# Patient Record
Sex: MALE | Race: WHITE | Employment: UNEMPLOYED | URBAN - METROPOLITAN AREA
[De-identification: names, ages, dates, MRNs, and addresses within clinical notes are randomized per-mention and may not be internally consistent; named-entity substitution may affect disease eponyms.]

---

## 2020-10-08 ENCOUNTER — APPOINTMENT (OUTPATIENT)
Dept: CT IMAGING | Age: 30
DRG: 896 | End: 2020-10-08
Attending: HOSPITALIST

## 2020-10-08 ENCOUNTER — HOSPITAL ENCOUNTER (INPATIENT)
Age: 30
LOS: 3 days | Discharge: HOME | DRG: 896 | End: 2020-10-11
Attending: EMERGENCY MEDICINE | Admitting: HOSPITALIST

## 2020-10-08 DIAGNOSIS — F10.930 ALCOHOL WITHDRAWAL SYNDROME WITHOUT COMPLICATION (HCC): ICD-10-CM

## 2020-10-08 DIAGNOSIS — K70.9 ALCOHOLIC LIVER DISEASE (HCC): ICD-10-CM

## 2020-10-08 DIAGNOSIS — D68.9 COAGULOPATHY (HCC): ICD-10-CM

## 2020-10-08 DIAGNOSIS — F10.931 DTS (DELIRIUM TREMENS) (HCC): Primary | ICD-10-CM

## 2020-10-08 DIAGNOSIS — R17 JAUNDICE: ICD-10-CM

## 2020-10-08 DIAGNOSIS — I81 PORTAL VEIN THROMBOSIS: ICD-10-CM

## 2020-10-08 DIAGNOSIS — K70.10 ALCOHOLIC HEPATITIS WITHOUT ASCITES: ICD-10-CM

## 2020-10-08 LAB
ALBUMIN SERPL-MCNC: 2.7 G/DL (ref 3.5–5)
ALBUMIN/GLOB SERPL: 0.5 {RATIO} (ref 1.1–2.2)
ALP SERPL-CCNC: 188 U/L (ref 45–117)
ALT SERPL-CCNC: 59 U/L (ref 12–78)
ANION GAP SERPL CALC-SCNC: 5 MMOL/L (ref 5–15)
AST SERPL-CCNC: 244 U/L (ref 15–37)
BASOPHILS # BLD: 0.1 K/UL (ref 0–0.1)
BASOPHILS NFR BLD: 1 % (ref 0–1)
BILIRUB SERPL-MCNC: 8 MG/DL (ref 0.2–1)
BUN SERPL-MCNC: 7 MG/DL (ref 6–20)
BUN/CREAT SERPL: 7 (ref 12–20)
CALCIUM SERPL-MCNC: 9.1 MG/DL (ref 8.5–10.1)
CHLORIDE SERPL-SCNC: 96 MMOL/L (ref 97–108)
CO2 SERPL-SCNC: 34 MMOL/L (ref 21–32)
COMMENT, HOLDF: NORMAL
CREAT SERPL-MCNC: 0.98 MG/DL (ref 0.7–1.3)
DIFFERENTIAL METHOD BLD: ABNORMAL
EOSINOPHIL # BLD: 0.2 K/UL (ref 0–0.4)
EOSINOPHIL NFR BLD: 2 % (ref 0–7)
ERYTHROCYTE [DISTWIDTH] IN BLOOD BY AUTOMATED COUNT: 14.6 % (ref 11.5–14.5)
ETHANOL SERPL-MCNC: <10 MG/DL
GLOBULIN SER CALC-MCNC: 5.8 G/DL (ref 2–4)
GLUCOSE SERPL-MCNC: 83 MG/DL (ref 65–100)
HCT VFR BLD AUTO: 30.8 % (ref 36.6–50.3)
HGB BLD-MCNC: 10.4 G/DL (ref 12.1–17)
IMM GRANULOCYTES # BLD AUTO: 0.1 K/UL (ref 0–0.04)
IMM GRANULOCYTES NFR BLD AUTO: 1 % (ref 0–0.5)
INR PPP: 2.2 (ref 0.9–1.1)
LYMPHOCYTES # BLD: 1.7 K/UL (ref 0.8–3.5)
LYMPHOCYTES NFR BLD: 16 % (ref 12–49)
MAGNESIUM SERPL-MCNC: 1 MG/DL (ref 1.6–2.4)
MCH RBC QN AUTO: 34.2 PG (ref 26–34)
MCHC RBC AUTO-ENTMCNC: 33.8 G/DL (ref 30–36.5)
MCV RBC AUTO: 101.3 FL (ref 80–99)
MONOCYTES # BLD: 1 K/UL (ref 0–1)
MONOCYTES NFR BLD: 10 % (ref 5–13)
NEUTS SEG # BLD: 7.8 K/UL (ref 1.8–8)
NEUTS SEG NFR BLD: 70 % (ref 32–75)
NRBC # BLD: 0 K/UL (ref 0–0.01)
NRBC BLD-RTO: 0 PER 100 WBC
PLATELET # BLD AUTO: 151 K/UL (ref 150–400)
PMV BLD AUTO: 10.2 FL (ref 8.9–12.9)
POTASSIUM SERPL-SCNC: 2.8 MMOL/L (ref 3.5–5.1)
PROT SERPL-MCNC: 8.5 G/DL (ref 6.4–8.2)
PROTHROMBIN TIME: 21.8 SEC (ref 9–11.1)
RBC # BLD AUTO: 3.04 M/UL (ref 4.1–5.7)
SAMPLES BEING HELD,HOLD: NORMAL
SODIUM SERPL-SCNC: 135 MMOL/L (ref 136–145)
WBC # BLD AUTO: 10.9 K/UL (ref 4.1–11.1)

## 2020-10-08 PROCEDURE — 80053 COMPREHEN METABOLIC PANEL: CPT

## 2020-10-08 PROCEDURE — C9113 INJ PANTOPRAZOLE SODIUM, VIA: HCPCS | Performed by: HOSPITALIST

## 2020-10-08 PROCEDURE — 74011250637 HC RX REV CODE- 250/637: Performed by: EMERGENCY MEDICINE

## 2020-10-08 PROCEDURE — 85610 PROTHROMBIN TIME: CPT

## 2020-10-08 PROCEDURE — 74176 CT ABD & PELVIS W/O CONTRAST: CPT

## 2020-10-08 PROCEDURE — 74011250636 HC RX REV CODE- 250/636: Performed by: HOSPITALIST

## 2020-10-08 PROCEDURE — 65660000000 HC RM CCU STEPDOWN

## 2020-10-08 PROCEDURE — 74011250636 HC RX REV CODE- 250/636: Performed by: EMERGENCY MEDICINE

## 2020-10-08 PROCEDURE — 36415 COLL VENOUS BLD VENIPUNCTURE: CPT

## 2020-10-08 PROCEDURE — 83735 ASSAY OF MAGNESIUM: CPT

## 2020-10-08 PROCEDURE — 99284 EMERGENCY DEPT VISIT MOD MDM: CPT

## 2020-10-08 PROCEDURE — 80307 DRUG TEST PRSMV CHEM ANLYZR: CPT

## 2020-10-08 PROCEDURE — 85025 COMPLETE CBC W/AUTO DIFF WBC: CPT

## 2020-10-08 RX ORDER — CETIRIZINE HCL 10 MG
10 TABLET ORAL DAILY
COMMUNITY
End: 2021-11-05

## 2020-10-08 RX ORDER — PHENOL/SODIUM PHENOLATE
20 AEROSOL, SPRAY (ML) MUCOUS MEMBRANE DAILY
COMMUNITY
End: 2020-10-11

## 2020-10-08 RX ORDER — DIAZEPAM 5 MG/1
10 TABLET ORAL
Status: DISCONTINUED | OUTPATIENT
Start: 2020-10-08 | End: 2020-10-11 | Stop reason: HOSPADM

## 2020-10-08 RX ORDER — POTASSIUM CHLORIDE 750 MG/1
40 TABLET, FILM COATED, EXTENDED RELEASE ORAL
Status: COMPLETED | OUTPATIENT
Start: 2020-10-08 | End: 2020-10-08

## 2020-10-08 RX ORDER — THERA TABS 400 MCG
1 TAB ORAL DAILY
COMMUNITY
End: 2021-11-05

## 2020-10-08 RX ORDER — LORAZEPAM 2 MG/ML
2 INJECTION INTRAMUSCULAR
Status: DISCONTINUED | OUTPATIENT
Start: 2020-10-08 | End: 2020-10-11 | Stop reason: HOSPADM

## 2020-10-08 RX ORDER — FOLIC ACID 1 MG/1
1 TABLET ORAL
Status: COMPLETED | OUTPATIENT
Start: 2020-10-08 | End: 2020-10-08

## 2020-10-08 RX ORDER — DIAZEPAM 5 MG/1
20 TABLET ORAL
Status: DISCONTINUED | OUTPATIENT
Start: 2020-10-08 | End: 2020-10-11 | Stop reason: HOSPADM

## 2020-10-08 RX ORDER — DIAZEPAM 5 MG/1
10 TABLET ORAL
Status: COMPLETED | OUTPATIENT
Start: 2020-10-08 | End: 2020-10-08

## 2020-10-08 RX ORDER — LANOLIN ALCOHOL/MO/W.PET/CERES
100 CREAM (GRAM) TOPICAL
Status: COMPLETED | OUTPATIENT
Start: 2020-10-08 | End: 2020-10-08

## 2020-10-08 RX ORDER — ONDANSETRON 2 MG/ML
4 INJECTION INTRAMUSCULAR; INTRAVENOUS
Status: DISCONTINUED | OUTPATIENT
Start: 2020-10-08 | End: 2020-10-11 | Stop reason: HOSPADM

## 2020-10-08 RX ORDER — LORAZEPAM 2 MG/ML
4 INJECTION INTRAMUSCULAR
Status: DISCONTINUED | OUTPATIENT
Start: 2020-10-08 | End: 2020-10-11 | Stop reason: HOSPADM

## 2020-10-08 RX ADMIN — FOLIC ACID 1 MG: 1 TABLET ORAL at 14:51

## 2020-10-08 RX ADMIN — DIAZEPAM 10 MG: 5 TABLET ORAL at 18:23

## 2020-10-08 RX ADMIN — DIAZEPAM 10 MG: 5 TABLET ORAL at 14:42

## 2020-10-08 RX ADMIN — POTASSIUM CHLORIDE 40 MEQ: 750 TABLET, FILM COATED, EXTENDED RELEASE ORAL at 15:47

## 2020-10-08 RX ADMIN — Medication 100 MG: at 14:52

## 2020-10-08 RX ADMIN — SODIUM CHLORIDE 40 MG: 9 INJECTION, SOLUTION INTRAMUSCULAR; INTRAVENOUS; SUBCUTANEOUS at 23:56

## 2020-10-08 RX ADMIN — SODIUM CHLORIDE 1000 ML: 900 INJECTION, SOLUTION INTRAVENOUS at 14:51

## 2020-10-08 RX ADMIN — DIAZEPAM 10 MG: 5 TABLET ORAL at 23:53

## 2020-10-08 RX ADMIN — DIAZEPAM 10 MG: 5 TABLET ORAL at 15:48

## 2020-10-08 NOTE — PROGRESS NOTES
Admission Medication Reconciliation:          Spoke with patient by telephone @ [phone] , unable to speak with patient face to face at this time due to general isolation precautions in the ED related to COVID-19 pandemic. Interview included questions regarding use of:  PTA medications including prescription/OTC, vitamins, supplements, inhaled, topical, injectable, otic and ophthalmic medications  Alcohol, nicotine products, THC and related compounds, illicit drugs, stimulant use (i.e., Red Bull), agents used to assist with sleep and/or pain control issues, and whether patient uses other people's medications of any kind    Notes:  ETOH: see chart notes-MD aware and treating DTs    Medication changes (since last review): Added all agents  Thank you for allowing me to participate in the care of your patient. Natalia Hart PharmD, RN # [phone]       [facility] pharmacy benefit data reflects medications filled and processed through the patient's insurance, however   this data does NOT capture whether the medication was picked up or is currently being taken by the patient. Allergies: Other medication    Significant PMH/Disease States:   Past Medical History:   Diagnosis Date    IVDU (intravenous drug user)      Chief Complaint for this Admission:    Chief Complaint   Patient presents with    Anxiety     Prior to Admission Medications:   Prior to Admission Medications   Prescriptions Last Dose Informant Taking? Omeprazole delayed release (PRILOSEC D/R) 20 mg tablet 10/7/2020 at Unknown time  Yes   Sig: Take 20 mg by mouth daily. cetirizine (ZYRTEC) 10 mg tablet 10/7/2020 at Unknown time  Yes   Sig: Take 10 mg by mouth daily. therapeutic multivitamin (THERAGRAN) tablet 10/7/2020 at Unknown time  Yes   Sig: Take 1 Tab by mouth daily.       Facility-Administered Medications: None       Please contact the main inpatient pharmacy with any questions or concerns at [phone] and we will direct you to the clinical pharmacist covering this patient's care while in-house.    JOAN Light

## 2020-10-08 NOTE — H&P
History & Physical    Primary Care Provider: Other, MD Soraya  Source of Information: Patient     History of Presenting Illness:   Sebastien Mcgrath is a 27 y.o. male who presents with abdominal pain     Pt started drinking again 2 yrs ago and has been drinking 10 shots of whiskey/day x 4-5 months. Quit drinking Sunday and had nausea/vomiting x 3 days. Yesterday developed auditory and visual hallucinations. Is also shaky and anxious.  Anitharashad Carr said he stopped drinking on Sunday and now pt having hallucinations and hearing things, denies feeling shaky, +nausea, denies SI or HI, pt stated he is getting \"angry at my brain because it won't shut off\", pt stated his liver levels might be off. Complaining of abd discomfort. Review of Systems:  General: HPI, no fever, low appetitie. No changes of weight  HEENT: no headache, no vision changes, no nose discharge, no hearing changes   RES: no wheezing, no cough, no sob  CVS: no cp, no palpitation. Muscular: no joint swelling, no muscle pain, no leg swelling  Skin: no rash, no itching   GI: HPI. : no dysuria, no hematuria  Hemo: no gum bleeding, no petechial   Neuro: no sensation changes, no focal weakness   Endo: no polydipsia   Psych: HPI     Past Medical History:   Diagnosis Date    IVDU (intravenous drug user)       History reviewed. No pertinent surgical history. Prior to Admission medications    Not on File     Allergies   Allergen Reactions    Other Medication Other (comments)     opiods      History reviewed. No pertinent family history.      SOCIAL HISTORY:  Patient resides:  Independently x   Assisted Living    SNF    With family care       Smoking history:   None x   Former    Chronic x     Alcohol history:   None    Social    Chronic x     Ambulates:   Independently x   w/cane    w/walker    w/wc    CODE STATUS:x  DNR    Full x   Other      Objective:     Physical Exam:     Visit Vitals  /71   Pulse (!) 105   Temp 97.6 °F (36.4 °C)   Resp 16   SpO2 99%      O2 Device: Room air    General:  Alert, cooperative, no distress, appears stated age. + jaundice    Head:  Normocephalic, without obvious abnormality, atraumatic. Eyes:  Conjunctivae/corneas clear. PERRL, EOMs intact. Nose: Nares normal. Septum midline. Mucosa normal. No drainage or sinus tenderness. Throat: Lips, mucosa, and tongue normal. Teeth and gums normal.   Neck: Supple, symmetrical, trachea midline, no adenopathy, thyroid: no enlargement/tenderness/nodules, no carotid bruit and no JVD. Back:   Symmetric, no curvature. ROM normal. No CVA tenderness. Lungs:   Clear to auscultation bilaterally. Chest wall:  No tenderness or deformity. Heart:  Regular rate and rhythm, S1, S2 normal, no murmur, click, rub or gallop. Abdomen:   Soft, epigastric tenderness . Bowel sounds normal. No masses,  No organomegaly. Extremities: Extremities normal, atraumatic, no cyanosis or edema. Pulses: 2+ and symmetric all extremities. Skin: Skin color, texture, turgor normal. No rashes or lesions   Neurologic: CNII-XII intact. No focal weakness              Data Review:     Recent Days:  Recent Labs     10/08/20  1424   WBC 10.9   HGB 10.4*   HCT 30.8*        Recent Labs     10/08/20  1424   *   K 2.8*   CL 96*   CO2 34*   GLU 83   BUN 7   CREA 0.98   CA 9.1   ALB 2.7*   ALT 59     No results for input(s): PH, PCO2, PO2, HCO3, FIO2 in the last 72 hours.     24 Hour Results:  Recent Results (from the past 24 hour(s))   CBC WITH AUTOMATED DIFF    Collection Time: 10/08/20  2:24 PM   Result Value Ref Range    WBC 10.9 4.1 - 11.1 K/uL    RBC 3.04 (L) 4.10 - 5.70 M/uL    HGB 10.4 (L) 12.1 - 17.0 g/dL    HCT 30.8 (L) 36.6 - 50.3 %    .3 (H) 80.0 - 99.0 FL    MCH 34.2 (H) 26.0 - 34.0 PG    MCHC 33.8 30.0 - 36.5 g/dL    RDW 14.6 (H) 11.5 - 14.5 %    PLATELET 468 621 - 592 K/uL    MPV 10.2 8.9 - 12.9 FL    NRBC 0.0 0  WBC    ABSOLUTE NRBC 0.00 0.00 - 0.01 K/uL NEUTROPHILS 70 32 - 75 %    LYMPHOCYTES 16 12 - 49 %    MONOCYTES 10 5 - 13 %    EOSINOPHILS 2 0 - 7 %    BASOPHILS 1 0 - 1 %    IMMATURE GRANULOCYTES 1 (H) 0.0 - 0.5 %    ABS. NEUTROPHILS 7.8 1.8 - 8.0 K/UL    ABS. LYMPHOCYTES 1.7 0.8 - 3.5 K/UL    ABS. MONOCYTES 1.0 0.0 - 1.0 K/UL    ABS. EOSINOPHILS 0.2 0.0 - 0.4 K/UL    ABS. BASOPHILS 0.1 0.0 - 0.1 K/UL    ABS. IMM. GRANS. 0.1 (H) 0.00 - 0.04 K/UL    DF AUTOMATED     METABOLIC PANEL, COMPREHENSIVE    Collection Time: 10/08/20  2:24 PM   Result Value Ref Range    Sodium 135 (L) 136 - 145 mmol/L    Potassium 2.8 (L) 3.5 - 5.1 mmol/L    Chloride 96 (L) 97 - 108 mmol/L    CO2 34 (H) 21 - 32 mmol/L    Anion gap 5 5 - 15 mmol/L    Glucose 83 65 - 100 mg/dL    BUN 7 6 - 20 MG/DL    Creatinine 0.98 0.70 - 1.30 MG/DL    BUN/Creatinine ratio 7 (L) 12 - 20      GFR est AA >60 >60 ml/min/1.73m2    GFR est non-AA >60 >60 ml/min/1.73m2    Calcium 9.1 8.5 - 10.1 MG/DL    Bilirubin, total 8.0 (H) 0.2 - 1.0 MG/DL    ALT (SGPT) 59 12 - 78 U/L    AST (SGOT) 244 (H) 15 - 37 U/L    Alk. phosphatase 188 (H) 45 - 117 U/L    Protein, total 8.5 (H) 6.4 - 8.2 g/dL    Albumin 2.7 (L) 3.5 - 5.0 g/dL    Globulin 5.8 (H) 2.0 - 4.0 g/dL    A-G Ratio 0.5 (L) 1.1 - 2.2     ETHYL ALCOHOL    Collection Time: 10/08/20  2:24 PM   Result Value Ref Range    ALCOHOL(ETHYL),SERUM <10 <10 MG/DL   SAMPLES BEING HELD    Collection Time: 10/08/20  2:24 PM   Result Value Ref Range    SAMPLES BEING HELD 1BLU     COMMENT        Add-on orders for these samples will be processed based on acceptable specimen integrity and analyte stability, which may vary by analyte. Imaging:   No results found. Assessment:     Active Problems:    DTs (delirium tremens) ([facility] (10/8/2020)           Plan:     1. DT/alcohol withdrawal: ativan per CIWA protocal, seizure precaution  2. Hyperbilirubinemia: like due to severe acholics hepatitis , will check abd CT to eval CBD and gallstones.  Check INR, hepatologist consult   3. Hypokalemia: gave po kcl in ER. Will check Mag too.         Signed By: Halina Gil MD     October 8, 2020

## 2020-10-08 NOTE — ED PROVIDER NOTES
Pt started drinking again 2 yrs ago and has been drinking 10 shots of whiskey/day x 4-5 months. Quit drinking Sunday and had nausea/vomiting x 3 days. Yesterday developed auditory and visual hallucinations. Is also shaky and anxious. The history is provided by the patient. Delirium Tremens (DTS)   Primary symptoms include: hallucinations. There areno confusion, no loss of consciousness, no seizures and no self-injury present at this time. This is a new problem. The current episode started yesterday. The problem has been gradually worsening. Suspected agents include alcohol. Associated symptoms include nausea and vomiting. Pertinent negatives include no fever. Associated medical issues do not include suicidal ideas. Past Medical History:   Diagnosis Date    IVDU (intravenous drug user)        History reviewed. No pertinent surgical history. History reviewed. No pertinent family history.     Social History     Socioeconomic History    Marital status: SINGLE     Spouse name: Not on file    Number of children: Not on file    Years of education: Not on file    Highest education level: Not on file   Occupational History    Not on file   Social Needs    Financial resource strain: Not on file    Food insecurity     Worry: Not on file     Inability: Not on file    Transportation needs     Medical: Not on file     Non-medical: Not on file   Tobacco Use    Smoking status: Current Every Day Smoker     Packs/day: 1.00   Substance and Sexual Activity    Alcohol use: Yes     Comment: 14-18 drinks/week    Drug use: Yes     Types: Heroin, Marijuana    Sexual activity: Not on file   Lifestyle    Physical activity     Days per week: Not on file     Minutes per session: Not on file    Stress: Not on file   Relationships    Social connections     Talks on phone: Not on file     Gets together: Not on file     Attends [employer] service: Not on file     Active member of club or organization: Not on file Attends meetings of clubs or organizations: Not on file     Relationship status: Not on file    Intimate partner violence     Fear of current or ex partner: Not on file     Emotionally abused: Not on file     Physically abused: Not on file     Forced sexual activity: Not on file   Other Topics Concern    Not on file   Social History Narrative    Not on file         ALLERGIES: Other medication    Review of Systems   Constitutional: Negative for chills and fever. Respiratory: Negative for shortness of breath. Cardiovascular: Negative for chest pain. Gastrointestinal: Positive for nausea and vomiting. Negative for abdominal pain, constipation and diarrhea. Neurological: Positive for tremors. Negative for dizziness, seizures, loss of consciousness and light-headedness. Psychiatric/Behavioral: Positive for hallucinations. Negative for confusion, self-injury and suicidal ideas. The patient is nervous/anxious. All other systems reviewed and are negative. Vitals:    10/08/20 1408   BP: (!) 142/87   Pulse: (!) 111   Resp: 16   Temp: 97.6 °F (36.4 °C)   SpO2: 99%            Physical Exam  Vitals signs and nursing note reviewed. Constitutional:       Appearance: He is well-developed. HENT:      Head: Normocephalic and atraumatic. Eyes:      General: No scleral icterus. Neck:      Musculoskeletal: Normal range of motion. Cardiovascular:      Rate and Rhythm: Tachycardia present. Pulmonary:      Effort: Pulmonary effort is normal.   Abdominal:      General: There is no distension. Skin:     Findings: No erythema or rash. Neurological:      Mental Status: He is alert and oriented to person, place, and time. Motor: Tremor present. Gait: Gait normal.   Psychiatric:         Attention and Perception: Attention and perception normal.         Mood and Affect: Mood is anxious. Speech: Speech is rapid and pressured. Behavior: Behavior is hyperactive.          Thought Content: Thought content normal.          MDM       Procedures      Patient is being admitted to the hospital.  The results of their tests and reasons for their admission have been discussed with them and/or available family. They convey agreement and understanding for the need to be admitted and for their admission diagnosis. Consultation will be made now with the inpatient physician for hospitalization. Perfect Serve Consult for Admission  3:15 PM    ED Room Number: ER12/12  Patient Name and age:  Ganesh Engle 27 y.o.  male  Working Diagnosis:   1.  DTs (delirium tremens) ([facility]      COVID-19 Suspicion:  no  Sepsis present:  no  Reassessment needed: no  Code Status:  Full Code  Readmission: no  Isolation Requirements:  no  Recommended Level of Care:  telemetry  Department:[facility] - 21   Other:  On CIWA

## 2020-10-08 NOTE — ED TRIAGE NOTES
Pt stated he stopped drinking on Sunday and now pt having hallucinations and hearing things, denies feeling shaky, +nausea, denies SI or HI, pt stated he is getting \"angry at my brain because it won't shut off\", pt stated his liver levels might be off, pt rambling in triage

## 2020-10-09 ENCOUNTER — APPOINTMENT (OUTPATIENT)
Dept: ULTRASOUND IMAGING | Age: 30
DRG: 896 | End: 2020-10-09
Attending: HOSPITALIST

## 2020-10-09 LAB
ALBUMIN SERPL-MCNC: 2.3 G/DL (ref 3.5–5)
ALBUMIN/GLOB SERPL: 0.5 {RATIO} (ref 1.1–2.2)
ALP SERPL-CCNC: 178 U/L (ref 45–117)
ALT SERPL-CCNC: 53 U/L (ref 12–78)
ANION GAP SERPL CALC-SCNC: 5 MMOL/L (ref 5–15)
AST SERPL-CCNC: 206 U/L (ref 15–37)
BILIRUB SERPL-MCNC: 6.3 MG/DL (ref 0.2–1)
BUN SERPL-MCNC: 8 MG/DL (ref 6–20)
BUN/CREAT SERPL: 9 (ref 12–20)
CALCIUM SERPL-MCNC: 7.3 MG/DL (ref 8.5–10.1)
CHLORIDE SERPL-SCNC: 103 MMOL/L (ref 97–108)
CO2 SERPL-SCNC: 29 MMOL/L (ref 21–32)
COMMENT, HOLDF: NORMAL
COMMENT, HOLDF: NORMAL
CREAT SERPL-MCNC: 0.92 MG/DL (ref 0.7–1.3)
GLOBULIN SER CALC-MCNC: 5.1 G/DL (ref 2–4)
GLUCOSE SERPL-MCNC: 83 MG/DL (ref 65–100)
INR PPP: 2.2 (ref 0.9–1.1)
LIPASE SERPL-CCNC: 213 U/L (ref 73–393)
MAGNESIUM SERPL-MCNC: 1 MG/DL (ref 1.6–2.4)
PHOSPHATE SERPL-MCNC: 1.6 MG/DL (ref 2.6–4.7)
POTASSIUM SERPL-SCNC: 3 MMOL/L (ref 3.5–5.1)
PROT SERPL-MCNC: 7.4 G/DL (ref 6.4–8.2)
PROTHROMBIN TIME: 22.2 SEC (ref 9–11.1)
SAMPLES BEING HELD,HOLD: NORMAL
SAMPLES BEING HELD,HOLD: NORMAL
SODIUM SERPL-SCNC: 137 MMOL/L (ref 136–145)

## 2020-10-09 PROCEDURE — 36415 COLL VENOUS BLD VENIPUNCTURE: CPT

## 2020-10-09 PROCEDURE — 80053 COMPREHEN METABOLIC PANEL: CPT

## 2020-10-09 PROCEDURE — C9113 INJ PANTOPRAZOLE SODIUM, VIA: HCPCS | Performed by: HOSPITALIST

## 2020-10-09 PROCEDURE — 83735 ASSAY OF MAGNESIUM: CPT

## 2020-10-09 PROCEDURE — 74011250637 HC RX REV CODE- 250/637: Performed by: EMERGENCY MEDICINE

## 2020-10-09 PROCEDURE — 74011250636 HC RX REV CODE- 250/636: Performed by: INTERNAL MEDICINE

## 2020-10-09 PROCEDURE — 74011250637 HC RX REV CODE- 250/637: Performed by: NURSE PRACTITIONER

## 2020-10-09 PROCEDURE — 85610 PROTHROMBIN TIME: CPT

## 2020-10-09 PROCEDURE — 74011000258 HC RX REV CODE- 258: Performed by: INTERNAL MEDICINE

## 2020-10-09 PROCEDURE — 74011250636 HC RX REV CODE- 250/636: Performed by: HOSPITALIST

## 2020-10-09 PROCEDURE — 74011000250 HC RX REV CODE- 250: Performed by: HOSPITALIST

## 2020-10-09 PROCEDURE — 76700 US EXAM ABDOM COMPLETE: CPT

## 2020-10-09 PROCEDURE — 84100 ASSAY OF PHOSPHORUS: CPT

## 2020-10-09 PROCEDURE — 99223 1ST HOSP IP/OBS HIGH 75: CPT | Performed by: INTERNAL MEDICINE

## 2020-10-09 PROCEDURE — 65660000000 HC RM CCU STEPDOWN

## 2020-10-09 PROCEDURE — 83690 ASSAY OF LIPASE: CPT

## 2020-10-09 RX ORDER — SERTRALINE HYDROCHLORIDE 50 MG/1
25 TABLET, FILM COATED ORAL DAILY
Status: DISCONTINUED | OUTPATIENT
Start: 2020-10-10 | End: 2020-10-11 | Stop reason: HOSPADM

## 2020-10-09 RX ORDER — DEXTROSE, SODIUM CHLORIDE, AND POTASSIUM CHLORIDE 5; .9; .15 G/100ML; G/100ML; G/100ML
125 INJECTION INTRAVENOUS CONTINUOUS
Status: DISCONTINUED | OUTPATIENT
Start: 2020-10-09 | End: 2020-10-11 | Stop reason: HOSPADM

## 2020-10-09 RX ORDER — MAGNESIUM SULFATE HEPTAHYDRATE 40 MG/ML
2 INJECTION, SOLUTION INTRAVENOUS ONCE
Status: COMPLETED | OUTPATIENT
Start: 2020-10-09 | End: 2020-10-09

## 2020-10-09 RX ORDER — HYDROXYZINE 25 MG/1
50 TABLET, FILM COATED ORAL
Status: DISCONTINUED | OUTPATIENT
Start: 2020-10-09 | End: 2020-10-11 | Stop reason: HOSPADM

## 2020-10-09 RX ORDER — QUETIAPINE FUMARATE 25 MG/1
25 TABLET, FILM COATED ORAL
Status: DISCONTINUED | OUTPATIENT
Start: 2020-10-09 | End: 2020-10-11 | Stop reason: HOSPADM

## 2020-10-09 RX ADMIN — POTASSIUM PHOSPHATE, MONOBASIC AND POTASSIUM PHOSPHATE, DIBASIC: 224; 236 INJECTION, SOLUTION, CONCENTRATE INTRAVENOUS at 08:32

## 2020-10-09 RX ADMIN — PHYTONADIONE 10 MG: 10 INJECTION, EMULSION INTRAMUSCULAR; INTRAVENOUS; SUBCUTANEOUS at 19:46

## 2020-10-09 RX ADMIN — POTASSIUM CHLORIDE, DEXTROSE MONOHYDRATE AND SODIUM CHLORIDE 125 ML/HR: 150; 5; 900 INJECTION, SOLUTION INTRAVENOUS at 17:34

## 2020-10-09 RX ADMIN — HYDROXYZINE HYDROCHLORIDE 50 MG: 25 TABLET, FILM COATED ORAL at 21:00

## 2020-10-09 RX ADMIN — SODIUM CHLORIDE 40 MG: 9 INJECTION, SOLUTION INTRAMUSCULAR; INTRAVENOUS; SUBCUTANEOUS at 08:32

## 2020-10-09 RX ADMIN — DIAZEPAM 20 MG: 5 TABLET ORAL at 18:56

## 2020-10-09 RX ADMIN — MAGNESIUM SULFATE 2 G: 2 INJECTION INTRAVENOUS at 09:18

## 2020-10-09 RX ADMIN — DIAZEPAM 10 MG: 5 TABLET ORAL at 13:05

## 2020-10-09 RX ADMIN — POTASSIUM CHLORIDE, DEXTROSE MONOHYDRATE AND SODIUM CHLORIDE 125 ML/HR: 150; 5; 900 INJECTION, SOLUTION INTRAVENOUS at 09:12

## 2020-10-09 RX ADMIN — SODIUM CHLORIDE 40 MG: 9 INJECTION, SOLUTION INTRAMUSCULAR; INTRAVENOUS; SUBCUTANEOUS at 20:59

## 2020-10-09 RX ADMIN — FOLIC ACID: 5 INJECTION, SOLUTION INTRAMUSCULAR; INTRAVENOUS; SUBCUTANEOUS at 05:19

## 2020-10-09 RX ADMIN — METHYLPREDNISOLONE SODIUM SUCCINATE 20 MG: 40 INJECTION, POWDER, FOR SOLUTION INTRAMUSCULAR; INTRAVENOUS at 20:54

## 2020-10-09 RX ADMIN — DIAZEPAM 10 MG: 5 TABLET ORAL at 21:03

## 2020-10-09 NOTE — PROGRESS NOTES
TRANSFER - OUT REPORT:    Verbal report given to Kenrick(name) on Toby Travis  being transferred to [facility](unit) for routine progression of care       Report consisted of patients Situation, Background, Assessment and   Recommendations(SBAR). Information from the following report(s) SBAR, Kardex, ED Summary, Intake/Output and Accordion was reviewed with the receiving nurse. Lines:   Peripheral IV 10/08/20 Left Antecubital (Active)   Site Assessment Clean, dry, & intact 10/09/20 1513   Phlebitis Assessment 0 10/09/20 1513   Infiltration Assessment 0 10/09/20 1513   Dressing Status Clean, dry, & intact 10/09/20 1513   Dressing Type Tape;Transparent 10/09/20 1513   Hub Color/Line Status Pink; Infusing 10/09/20 1513   Action Taken Open ports on tubing capped 10/09/20 1513   Alcohol Cap Used Yes 10/09/20 1513       Peripheral IV 10/09/20 Right Hand (Active)   Site Assessment Clean, dry, & intact 10/09/20 1513   Phlebitis Assessment 0 10/09/20 1513   Infiltration Assessment 0 10/09/20 1513   Dressing Status Clean, dry, & intact 10/09/20 1513   Dressing Type Tape;Transparent 10/09/20 1513   Hub Color/Line Status Pink;Flushed;Capped 10/09/20 1513   Action Taken Open ports on tubing capped 10/09/20 1513   Alcohol Cap Used Yes 10/09/20 1513        Opportunity for questions and clarification was provided. Patient transported with:   Tech                    Problem: Alcohol Withdrawal  Goal: *STG: Participates in treatment plan  Outcome: Progressing Towards Goal     Pt on CIWA. Valium given once.      Problem: Alcohol Withdrawal  Goal: *STG: Remains safe in hospital  Outcome: Progressing Towards Goal

## 2020-10-09 NOTE — ED NOTES
Bedside shift change report given to Paulette (oncoming nurse) by Jimbo Mcdaniel (offgoing nurse). Report included the following information SBAR, ED Summary and Recent Results.

## 2020-10-09 NOTE — CONSULTS
[address] [facility], [facility], MD Nargis Pereyra, AGNIESZKA Morales, [facility]     Coreen Fall, [facility]   Hamilton Magno, MARIE Cantrell, [facility]       Robe Regalado [facility] [address]    at [address], [facility] Yemi Lomas  22.    [phone]    FAX: [address] [place] [address], [address]    [phone]    FAX: [phone]         HEPATOLOGY CONSULT NOTE  I was asked to see this patient in consultation by Dr Rocio Nowak for management of alcoholic hepatitis. I have reviewed the Emergency room note, Hospital admission note, Notes by all other physicians who have seen the patient during this hospitalization to date. I have reviewed the problem list and the reason for this hospitalization. I have reviewed the allergies and the medications the patient was taking at home prior to this hospitalization. HISTORY:  The patient is a 27year old  male with a long history of alcohol abuse and IV drug abuse. He stopped using IV drugs in 2015. Entered alcohol rehab, was abstinent from several ears and thought he could drink socialy. This escalated and for the past several months he has been consuming 6 shots per day. This AM he felt poorly, noted his eyes were yellow, vomited and then had a \"panic attack\". He was brought to the ED by his brother. The is no other PMH. In the ED he was found to have  TBILI 8.0 and INR 2.2 with DF 44. ASSESSMENT AND PLAN:  Alcohol liver disease  The diagnosis is based upon a history of consuming alcohol in excess. The histologic severity has not been defined. The patient is consuming 6 alcoholic beverages daily.   Last drink was the day prior to coming to the ED    Discussed the need to stop using alcohol   Will monitor for alcohol withdrawal and DTs    Alcohol hepatitis  There is an elevation in TBILI to 8.0. The INR is prolonged to 2.2. The DF is 44. The alcoholic hepatitis is severe and associated with a 30% mortality in the next 6 months. Will start IV solumedrol 20 mg BID   Will convert to PO prednisone when ready to DC and continue steroids for full 28 days. Although the CT suggests cirrhosis it is not clear if the patient has developed cirrhosis. Coagulopathy  This is secondary to alcoholic hepatitis  Will treat with 3 doses of Vitamin K over 3 days. 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 Will need dynamic MRI to clarify this. Would treat with anticoagulation if thre is PV thrombosis. Cannot treat with anticoagulation now in setting of coagulopathy. ETOH hep needs to show recovery first.    Acute pancreatitis  The CT suggests the pancreas is inflamed. No Lipase availble. This may have contributed to PV thrombosis      SYSTEM REVIEW:  Constitution systems: Negative for fever, chills, weight gain, weight loss. Eyes: Negative for visual changes. ENT: Negative for sore throat, painful swallowing. Respiratory: Negative for cough, hemoptysis, SOB. Cardiology: Negative for chest pain, palpitations. GI:  Negative for constipation or diarrhea. : Negative for urinary frequency, dysuria, hematuria, nocturia. Skin: Negative for rash. Hematology: Negative for easy bruising, blood clots. Musculo-skelatal: Negative for back pain, muscle pain, weakness. Neurologic: Negative for headaches, dizziness, vertigo, memory problems not related to HE. Psychology: Negative for anxiety, depression. FAMILY HISTORY:  The patient has no knowledge of the father's medical condition.     The mother Has/had the following chronic disease(s): Consumes alcohol in excess  There is no family history of liver disease. SOCIAL HISTORY:  The patient has never been . The patient has no children. The patient currently smokes a few cigarettes daily. The patient consumes 6 alcoholic beverages per day   The patient used to work for [employer]. He was laid off during [employer]. He is now looking for a new job. PHYSICAL EXAMINATION:  VS: per nursing note  General:  No acute distress. Eyes:  Sclera icteric  ENT:  No oral lesions. Thyroid normal.  Nodes:  No adenopathy. Skin:  No spider angiomata. No jaundice. Respiratory:  Lungs clear to auscultation. Cardiovascular:  Regular heart rate. Abdomen:  Soft non-tender, No obvious ascites. Extremities:  No lower extremity edema. Neurologic:  Alert and oriented. Cranial nerves grossly intact. No asterixis. LABORATORY:  Results for Maame Lawler (MRN [id number]) as of 10/9/2020 18:27   Ref. Range 10/8/2020 14:24 10/9/2020 05:22 10/9/2020 05:41   WBC Latest Ref Range: 4.1 - 11.1 K/uL 10.9     HGB Latest Ref Range: 12.1 - 17.0 g/dL 10.4 (L)     PLATELET Latest Ref Range: 150 - 400 K/uL 151     INR Latest Ref Range: 0.9 - 1.1   2.2 (H) 2.2 (H)    Sodium Latest Ref Range: 136 - 145 mmol/L 135 (L) 137    Potassium Latest Ref Range: 3.5 - 5.1 mmol/L 2.8 (L) 3.0 (L)    Chloride Latest Ref Range: 97 - 108 mmol/L 96 (L) 103    CO2 Latest Ref Range: 21 - 32 mmol/L 34 (H) 29    Glucose Latest Ref Range: 65 - 100 mg/dL 83 83    BUN Latest Ref Range: 6 - 20 MG/DL 7 8    Creatinine Latest Ref Range: 0.70 - 1.30 MG/DL 0.98 0.92    Bilirubin, total Latest Ref Range: 0.2 - 1.0 MG/DL 8.0 (H) 6.3 (H)    Albumin Latest Ref Range: 3.5 - 5.0 g/dL 2.7 (L) 2.3 (L)    ALT Latest Ref Range: 12 - 78 U/L 59 53    AST Latest Ref Range: 15 - 37 U/L 244 (H) 206 (H)    Alk. phosphatase Latest Ref Range: 45 - 117 U/L 188 (H) 178 (H)        RADIOLOGY:  10/2020.   CT scan abdomen without IV contrast.  Changes consistent with cirrhosis. No liver mass lesions. No dilated bile ducts. Spontaneous splenorenal shunt. No ascites. 10/2020. Ultrasound of liver. Echogenic consistent with fatty liver. No liver mass lesions. Possible portal vein thrombosis. No dilated bile ducts. No ascites.         Sherri Osborn MD  [address] [place] [address].  [phone]  [address] [place] [address]

## 2020-10-09 NOTE — PROGRESS NOTES
[address] [facility] Adult  Hospitalist Group                                                                                          Hospitalist Progress Note  Rene Tamez MD  Answering service: [phone] OR [phone] from in house phone        Date of Service:  10/9/2020  NAME:  Ganesh Engle  :  1990  MRN:  [id number]    This documentation was facilitated by a Voice Recognition software and may contain inadvertent typographical errors. Admission Summary:   Patient with alcohol abuse,who reportedly quitted drinking last  is admitted for alcohol withdrawal when he presented with visual and auditory hallucination and nausea. Interval history / Subjective:        Patient seen and examined. He is seen in the ER. He notes feeling much better. He tells me he has chronic shakiness in the right hand, and sees shadows out of the right, it has been more than 10 months. Otherwise he denied visual or auditory hallucination. He states his last drink was on   Assessment & Plan:   Alcohol withdrawal/DTs  -CIWA protocol in place  -Thiamine and folic acid supplement  -Seizure precautions    Alcohol abuse  -Counseled on quitting. He has been to [place] [address] and other counseling. Has been sober previously. Acute etoh hepatitis  -DF 52. Start solumedrol. Hepatology consulted. Hypokalemia: replacing. Monitor   Hypomagnesemia: replace and monitor   Hypophosphatemia: replacement ordered. Monitor   Coagulopathy w/o bleeding due to alcoholic liver disease. Code status: full  DVT prophylaxis: scd  Care Plan discussed with: Patient/Family  Anticipated Disposition: Home w/Family  Anticipated Discharge: 24 hours to 48 hours  Hospital Problems  Never Reviewed          Codes Class Noted POA    DTs (delirium tremens) ([facility] ICD-10-CM: V18.190  ICD-9-CM: 291.0  10/8/2020 Unknown                Review of Systems:   A comprehensive review of systems was negative except for that written in the HPI. Vital Signs:    Last 24hrs VS reviewed since prior progress note. Most recent are:  Visit Vitals  /61   Pulse (!) 103   Temp 97.6 °F (36.4 °C)   Resp 17   SpO2 92%         Intake/Output Summary (Last 24 hours) at 10/9/2020 0723  Last data filed at 10/8/2020 1805  Gross per 24 hour   Intake 1000 ml   Output    Net 1000 ml        Physical Examination:             Constitutional:  No acute distress, cooperative, pleasant    HEENT:  Icteric sclera   Resp:  CTA bilaterally. No wheezing/rhonchi/rales. No accessory muscle use   Chest Wall: No deformity   CV:  Regular rhythm, normal rate, no murmurs, gallops, rubs    GI:  Soft, non distended, non tender. normoactive bowel sounds, no hepatosplenomegaly    :  No CVA or suprapubic tenderness    Musculoskeletal:  No edema, warm, 2+ pulses throughout    Neurologic:  Mental status:AAOx3,   Cranial nerves II-XII : WNL  Motor exam:Moves all extremities symmetrically  Hands shaky R>L              Data Review:    Review and/or order of clinical lab test  Review and/or order of tests in the radiology section of CPT  Review and/or order of tests in the medicine section of CPT      Labs:     Recent Labs     10/08/20  1424   WBC 10.9   HGB 10.4*   HCT 30.8*        Recent Labs     10/09/20  0522 10/08/20  1530 10/08/20  1424     --  135*   K 3.0*  --  2.8*     --  96*   CO2 29  --  34*   BUN 8  --  7   CREA 0.92  --  0.98   GLU 83  --  83   CA 7.3*  --  9.1   MG 1.0* 1.0*  --    PHOS 1.6*  --   --      Recent Labs     10/09/20  0522 10/08/20  1424   ALT 53 59   * 188*   TBILI 6.3* 8.0*   TP 7.4 8.5*   ALB 2.3* 2.7*   GLOB 5.1* 5.8*     Recent Labs     10/09/20  0541 10/08/20  1645   INR 2.2* 2.2*   PTP 22.2* 21.8*      No results for input(s): FE, TIBC, PSAT, FERR in the last 72 hours. No results found for: FOL, RBCF   No results for input(s): PH, PCO2, PO2 in the last 72 hours.   No results for input(s): CPK, CKNDX, TROIQ in the last 72 hours.    No lab exists for component: CPKMB  No results found for: CHOL, CHOLX, CHLST, CHOLV, HDL, HDLP, LDL, LDLC, DLDLP, TGLX, TRIGL, TRIGP, CHHD, CHHDX  No results found for: GLUCPOC  No results found for: COLOR, APPRN, SPGRU, REFSG, CHRISTIANO, PROTU, GLUCU, KETU, BILU, UROU, TAMIKO, LEUKU, GLUKE, EPSU, BACTU, WBCU, RBCU, CASTS, UCRY      Medications Reviewed:     Current Facility-Administered Medications   Medication Dose Route Frequency    dextrose 5% - 0.9% NaCl with KCl 20 mEq/L infusion  125 mL/hr IntraVENous CONTINUOUS    magnesium sulfate 2 g/50 ml IVPB (premix or compounded)  2 g IntraVENous ONCE    potassium phosphate 30 mmol in 0.9% sodium chloride 500 mL infusion   IntraVENous ONCE    diazePAM (VALIUM) tablet 10 mg  10 mg Oral Q1H PRN    diazePAM (VALIUM) tablet 20 mg  20 mg Oral Q1H PRN    LORazepam (ATIVAN) injection 2 mg  2 mg IntraVENous Q1H PRN    LORazepam (ATIVAN) injection 4 mg  4 mg IntraVENous Q1H PRN    0.9% sodium chloride 3,523 mL with folic acid 1 mg, thiamine 100 mg, mvi, adult no. 4 with vit K 10 mL infusion   IntraVENous DAILY    prochlorperazine (COMPAZINE) with saline injection 10 mg  10 mg IntraVENous Q6H PRN    ondansetron (ZOFRAN) injection 4 mg  4 mg IntraVENous Q6H PRN    pantoprazole (PROTONIX) 40 mg in 0.9% sodium chloride 10 mL injection  40 mg IntraVENous Q12H     Current Outpatient Medications   Medication Sig    cetirizine (ZYRTEC) 10 mg tablet Take 10 mg by mouth daily.  Omeprazole delayed release (PRILOSEC D/R) 20 mg tablet Take 20 mg by mouth daily.  therapeutic multivitamin (THERAGRAN) tablet Take 1 Tab by mouth daily.      ______________________________________________________________________  EXPECTED LENGTH OF STAY: - - -  ACTUAL LENGTH OF STAY:          1                 Vitor Babcock MD

## 2020-10-09 NOTE — CONSULTS
PSYCHIATRY CONSULT NOTE:    REASON FOR CONSULT: anxiety, alcohol abuse      HISTORY OF PRESENTING COMPLAINT:  Ganesh Engle is a 27 y.o. WHITE [place]  male who is currently admitted to the medical floor at [address]. He presented with abdominal pain. He started drinking again 2 yrs ago and has been drinking 10 shots of whiskey/day x 4-5 months.  Quit drinking Sunday and had nausea/vomiting x 3 days. 2 days ago he developed auditory and visual hallucinations, was also shaky and anxious. He previously attended a 28-day treatment at [facility]. He states he is also struggling from anxiety, but was never formally diagnosed. Few days ago, he felt he was having a heart attack, going to cry, restless, shaking, but was also coming off etoh. Sleeping poorly. Denies si hi or avh. PAST PSYCHIATRIC HISTORY and SUBSTANCE ABUSE HISTORY:  He was never admitted to any inpatient psychiatric facility but was at [facility] for [facility] treatment. He has never been on any antidepressants. PAST MEDICAL HISTORY:  Please see H&P for details. Past Medical History:   Diagnosis Date    IVDU (intravenous drug user)            Lab Results   Component Value Date/Time    WBC 10.9 10/08/2020 02:24 PM    HGB 10.4 (L) 10/08/2020 02:24 PM    HCT 30.8 (L) 10/08/2020 02:24 PM    PLATELET 102 30/06/4062 02:24 PM    .3 (H) 10/08/2020 02:24 PM      Lab Results   Component Value Date/Time    Sodium 137 10/09/2020 05:22 AM    Potassium 3.0 (L) 10/09/2020 05:22 AM    Chloride 103 10/09/2020 05:22 AM    CO2 29 10/09/2020 05:22 AM    Anion gap 5 10/09/2020 05:22 AM    Glucose 83 10/09/2020 05:22 AM    BUN 8 10/09/2020 05:22 AM    Creatinine 0.92 10/09/2020 05:22 AM    BUN/Creatinine ratio 9 (L) 10/09/2020 05:22 AM    GFR est AA >60 10/09/2020 05:22 AM    GFR est non-AA >60 10/09/2020 05:22 AM    Calcium 7.3 (L) 10/09/2020 05:22 AM    Bilirubin, total 6.3 (H) 10/09/2020 05:22 AM    Alk.  phosphatase 178 (H) 10/09/2020 05:22 AM    Protein, total 7.4 10/09/2020 05:22 AM    Albumin 2.3 (L) 10/09/2020 05:22 AM    Globulin 5.1 (H) 10/09/2020 05:22 AM    A-G Ratio 0.5 (L) 10/09/2020 05:22 AM    ALT (SGPT) 53 10/09/2020 05:22 AM          PSYCHOSOCIAL HISTORY:  He is single and has no children. MENTAL STATUS EXAM:    General appearance:  Dressed in street clothes. Psychomotor activity is wnl   Eye contact: good eye contact  Speech: Spontaneous  Affect : full  Mood: \"good\"  Thought Process: Logical, goal directed  Perception: Denies AH or VH. Thought Content: denies SI or Plan  Insight: Partial  Judgement: Fair  Cognition: Intact grossly. ASSESSMENT AND PLAN:  Derrick  meets criteria for a diagnosis of  Unspecified mood disorder; etoh use disorder, severe. Continue ciwa protocol. I will start zoloft 25mg po daily, atarax 50mg po q6hrs prn for anxiety, and seroquel 25mg q6hrs prn for anxiety/sleep. I strongly urged him to maintain a sober lifestyle and to be active in the recovery community. I recommend looking into [facility] Substance Abuse partial hospitalization program.  I also recommend an inpatient rehab that could be offered from the [facility] or the [facility] as these recommendations would hold the strongest likelihood of success. Please seek case management's assistance. Thank you for this kind consult.

## 2020-10-10 LAB
ALBUMIN SERPL-MCNC: 2.2 G/DL (ref 3.5–5)
ALBUMIN/GLOB SERPL: 0.4 {RATIO} (ref 1.1–2.2)
ALP SERPL-CCNC: 176 U/L (ref 45–117)
ALT SERPL-CCNC: 50 U/L (ref 12–78)
ANION GAP SERPL CALC-SCNC: 6 MMOL/L (ref 5–15)
AST SERPL-CCNC: 198 U/L (ref 15–37)
BASOPHILS # BLD: 0 K/UL (ref 0–0.1)
BASOPHILS NFR BLD: 0 % (ref 0–1)
BILIRUB SERPL-MCNC: 5.8 MG/DL (ref 0.2–1)
BUN SERPL-MCNC: 8 MG/DL (ref 6–20)
BUN/CREAT SERPL: 9 (ref 12–20)
CALCIUM SERPL-MCNC: 7 MG/DL (ref 8.5–10.1)
CHLORIDE SERPL-SCNC: 107 MMOL/L (ref 97–108)
CO2 SERPL-SCNC: 26 MMOL/L (ref 21–32)
CREAT SERPL-MCNC: 0.89 MG/DL (ref 0.7–1.3)
DIFFERENTIAL METHOD BLD: ABNORMAL
EOSINOPHIL # BLD: 0.1 K/UL (ref 0–0.4)
EOSINOPHIL NFR BLD: 1 % (ref 0–7)
ERYTHROCYTE [DISTWIDTH] IN BLOOD BY AUTOMATED COUNT: 14.9 % (ref 11.5–14.5)
GLOBULIN SER CALC-MCNC: 5.4 G/DL (ref 2–4)
GLUCOSE SERPL-MCNC: 148 MG/DL (ref 65–100)
HCT VFR BLD AUTO: 26.9 % (ref 36.6–50.3)
HGB BLD-MCNC: 9.1 G/DL (ref 12.1–17)
IMM GRANULOCYTES # BLD AUTO: 0 K/UL (ref 0–0.04)
IMM GRANULOCYTES NFR BLD AUTO: 0 % (ref 0–0.5)
INR PPP: 2.6 (ref 0.9–1.1)
LYMPHOCYTES # BLD: 0.5 K/UL (ref 0.8–3.5)
LYMPHOCYTES NFR BLD: 10 % (ref 12–49)
MAGNESIUM SERPL-MCNC: 1.5 MG/DL (ref 1.6–2.4)
MCH RBC QN AUTO: 34.6 PG (ref 26–34)
MCHC RBC AUTO-ENTMCNC: 33.8 G/DL (ref 30–36.5)
MCV RBC AUTO: 102.3 FL (ref 80–99)
MONOCYTES # BLD: 0.2 K/UL (ref 0–1)
MONOCYTES NFR BLD: 3 % (ref 5–13)
NEUTS SEG # BLD: 4.6 K/UL (ref 1.8–8)
NEUTS SEG NFR BLD: 86 % (ref 32–75)
NRBC # BLD: 0 K/UL (ref 0–0.01)
NRBC BLD-RTO: 0 PER 100 WBC
PHOSPHATE SERPL-MCNC: 1.7 MG/DL (ref 2.6–4.7)
PLATELET # BLD AUTO: 126 K/UL (ref 150–400)
PMV BLD AUTO: 10.4 FL (ref 8.9–12.9)
POTASSIUM SERPL-SCNC: 3.7 MMOL/L (ref 3.5–5.1)
PROT SERPL-MCNC: 7.6 G/DL (ref 6.4–8.2)
PROTHROMBIN TIME: 26 SEC (ref 9–11.1)
RBC # BLD AUTO: 2.63 M/UL (ref 4.1–5.7)
RBC MORPH BLD: ABNORMAL
SODIUM SERPL-SCNC: 139 MMOL/L (ref 136–145)
WBC # BLD AUTO: 5.4 K/UL (ref 4.1–11.1)

## 2020-10-10 PROCEDURE — 74011250637 HC RX REV CODE- 250/637: Performed by: NURSE PRACTITIONER

## 2020-10-10 PROCEDURE — 85025 COMPLETE CBC W/AUTO DIFF WBC: CPT

## 2020-10-10 PROCEDURE — 74011250636 HC RX REV CODE- 250/636: Performed by: HOSPITALIST

## 2020-10-10 PROCEDURE — 85610 PROTHROMBIN TIME: CPT

## 2020-10-10 PROCEDURE — 84100 ASSAY OF PHOSPHORUS: CPT

## 2020-10-10 PROCEDURE — 74011250636 HC RX REV CODE- 250/636: Performed by: INTERNAL MEDICINE

## 2020-10-10 PROCEDURE — 80053 COMPREHEN METABOLIC PANEL: CPT

## 2020-10-10 PROCEDURE — 83735 ASSAY OF MAGNESIUM: CPT

## 2020-10-10 PROCEDURE — 99233 SBSQ HOSP IP/OBS HIGH 50: CPT | Performed by: INTERNAL MEDICINE

## 2020-10-10 PROCEDURE — C9113 INJ PANTOPRAZOLE SODIUM, VIA: HCPCS | Performed by: HOSPITALIST

## 2020-10-10 PROCEDURE — 74011000258 HC RX REV CODE- 258: Performed by: INTERNAL MEDICINE

## 2020-10-10 PROCEDURE — 65270000032 HC RM SEMIPRIVATE

## 2020-10-10 PROCEDURE — 74011000250 HC RX REV CODE- 250: Performed by: HOSPITALIST

## 2020-10-10 PROCEDURE — 36415 COLL VENOUS BLD VENIPUNCTURE: CPT

## 2020-10-10 RX ORDER — MAGNESIUM SULFATE HEPTAHYDRATE 40 MG/ML
2 INJECTION, SOLUTION INTRAVENOUS ONCE
Status: COMPLETED | OUTPATIENT
Start: 2020-10-10 | End: 2020-10-10

## 2020-10-10 RX ADMIN — SERTRALINE HYDROCHLORIDE 25 MG: 50 TABLET ORAL at 08:28

## 2020-10-10 RX ADMIN — POTASSIUM PHOSPHATE, MONOBASIC AND POTASSIUM PHOSPHATE, DIBASIC: 224; 236 INJECTION, SOLUTION, CONCENTRATE INTRAVENOUS at 09:38

## 2020-10-10 RX ADMIN — FOLIC ACID: 5 INJECTION, SOLUTION INTRAMUSCULAR; INTRAVENOUS; SUBCUTANEOUS at 08:50

## 2020-10-10 RX ADMIN — METHYLPREDNISOLONE SODIUM SUCCINATE 20 MG: 40 INJECTION, POWDER, FOR SOLUTION INTRAMUSCULAR; INTRAVENOUS at 21:14

## 2020-10-10 RX ADMIN — MAGNESIUM SULFATE 2 G: 2 INJECTION INTRAVENOUS at 08:29

## 2020-10-10 RX ADMIN — POTASSIUM CHLORIDE, DEXTROSE MONOHYDRATE AND SODIUM CHLORIDE 125 ML/HR: 150; 5; 900 INJECTION, SOLUTION INTRAVENOUS at 21:24

## 2020-10-10 RX ADMIN — QUETIAPINE FUMARATE 25 MG: 25 TABLET ORAL at 21:34

## 2020-10-10 RX ADMIN — METHYLPREDNISOLONE SODIUM SUCCINATE 20 MG: 40 INJECTION, POWDER, FOR SOLUTION INTRAMUSCULAR; INTRAVENOUS at 08:28

## 2020-10-10 RX ADMIN — SODIUM CHLORIDE 40 MG: 9 INJECTION, SOLUTION INTRAMUSCULAR; INTRAVENOUS; SUBCUTANEOUS at 21:14

## 2020-10-10 RX ADMIN — LORAZEPAM 2 MG: 2 INJECTION INTRAMUSCULAR; INTRAVENOUS at 18:40

## 2020-10-10 RX ADMIN — PHYTONADIONE 10 MG: 10 INJECTION, EMULSION INTRAMUSCULAR; INTRAVENOUS; SUBCUTANEOUS at 08:50

## 2020-10-10 RX ADMIN — SODIUM CHLORIDE 40 MG: 9 INJECTION, SOLUTION INTRAMUSCULAR; INTRAVENOUS; SUBCUTANEOUS at 08:28

## 2020-10-10 NOTE — PROGRESS NOTES
Chriss Beckford MD, [address], Cite Christine Castellanos MD Orville Sayres, PA-C Karyl Hunger, [facility]     April S Juan David, [facility]   Zanathen Osheas, SIMRAN-FATMATA Patel, [facility]       [facility] [address]    at [address], [address] Yemi Lomas  22.    [phone]    FAX: [address] [place] [address], [address], [address]    [phone]    FAX: [phone]         HEPATOLOGY PROGRESS NOTE  The patient is a 27year old  male with a long history of alcohol abuse and IV drug abuse. He stopped using IV drugs in 2015. Entered alcohol rehab, was abstinent from several ears and thought he could drink socialy. This escalated and for the past several months he has been consuming 6 shots per day. This AM he felt poorly, noted his eyes were yellow, vomited and then had a \"panic attack\". He was brought to the ED by his brother. The is no other PMH. In the ED he was found to have  TBILI 8.0 and INR 2.2 with DF 44. INR is up today pushing DF to 68. However, he feels good. Has good appetite and less tremor. Can take bed alarm off and allow him to walk around room or in tong. ASSESSMENT AND PLAN:  Alcohol liver disease  The diagnosis is based upon a history of consuming alcohol in excess. The histologic severity has not been defined. The patient is consuming 6 alcoholic beverages daily. Last drink was the day prior to coming to the ED    Discussed the need to stop using alcohol   Will monitor for alcohol withdrawal and DTs    Alcohol hepatitis  The TBILI is down to 6.8. But the INR has increased to 2.6. The DF is now up to 79.   The alcoholic hepatitis is severe and associated with a 30% mortality in the next 6 months. He is on IV solumedrol 20 mg BID since 10/9. Although the CT suggests cirrhosis it is not clear if the patient has developed cirrhosis. Coagulopathy  This is secondary to alcoholic hepatitis  Will treat with 3 doses of Vitamin K over 3 days. PV thombosis - spontaneous splenorenal shunt  The US suggests no flow in the PV suggesting PV thrombosis  The CT scan suggests there is a spontaneous splenorenal shunt. Will need dynamic MRI to clarify this. Would treat with anticoagulation if thre is PV thrombosis. Cannot treat with anticoagulation now in setting of coagulopathy. ETOH hep needs to show recovery first.    Acute pancreatitis  The CT suggests the pancreas is inflamed. No Lipase availble. This may have contributed to PV thrombosis    PHYSICAL EXAMINATION:  VS: per nursing note  General:  No acute distress. Eyes:  Sclera icteric  ENT:  No oral lesions. Thyroid normal.  Nodes:  No adenopathy. Skin:  No spider angiomata. No jaundice. Respiratory:  Lungs clear to auscultation. Cardiovascular:  Regular heart rate. Abdomen:  Soft non-tender, No obvious ascites. Extremities:  No lower extremity edema. Neurologic:  Alert and oriented. Cranial nerves grossly intact. No asterixis. LABORATORY:  Results for Hussein Hernandez (MRN [id number]) as of 10/10/2020 14:41   Ref.  Range 10/8/2020 14:24 10/9/2020 05:22 10/10/2020 01:44   WBC Latest Ref Range: 4.1 - 11.1 K/uL 10.9  5.4   HGB Latest Ref Range: 12.1 - 17.0 g/dL 10.4 (L)  9.1 (L)   PLATELET Latest Ref Range: 150 - 400 K/uL 151  126 (L)   INR Latest Ref Range: 0.9 - 1.1   2.2 (H) 2.2 (H) 2.6 (H)   Sodium Latest Ref Range: 136 - 145 mmol/L 135 (L) 137 139   Potassium Latest Ref Range: 3.5 - 5.1 mmol/L 2.8 (L) 3.0 (L) 3.7   Chloride Latest Ref Range: 97 - 108 mmol/L 96 (L) 103 107   CO2 Latest Ref Range: 21 - 32 mmol/L 34 (H) 29 26   Glucose Latest Ref Range: 65 - 100 mg/dL 83 83 148 (H)   BUN Latest Ref Range: 6 - 20 MG/DL 7 8 8 Creatinine Latest Ref Range: 0.70 - 1.30 MG/DL 0.98 0.92 0.89   Bilirubin, total Latest Ref Range: 0.2 - 1.0 MG/DL 8.0 (H) 6.3 (H) 5.8 (H)   Albumin Latest Ref Range: 3.5 - 5.0 g/dL 2.7 (L) 2.3 (L) 2.2 (L)   ALT Latest Ref Range: 12 - 78 U/L 59 53 50   AST Latest Ref Range: 15 - 37 U/L 244 (H) 206 (H) 198 (H)   Alk. phosphatase Latest Ref Range: 45 - 117 U/L 188 (H) 178 (H) 176 (H)         RADIOLOGY:  10/2020. CT scan abdomen without IV contrast.  Changes consistent with cirrhosis. No liver mass lesions. No dilated bile ducts. Spontaneous splenorenal shunt. No ascites. 10/2020. Ultrasound of liver. Echogenic consistent with fatty liver. No liver mass lesions. Possible portal vein thrombosis. No dilated bile ducts. No ascites.         Felisha Lara MD  [facility] [address], [address] [place] [address] 22.  [phone]  [address] [place] [address]

## 2020-10-10 NOTE — PROGRESS NOTES
Problem: Alcohol Withdrawal  Goal: *STG: Participates in treatment plan  Outcome: Progressing Towards Goal  Goal: *STG: Remains safe in hospital  Outcome: Progressing Towards Goal  Goal: *STG: Seeks staff when symptoms of withdrawal increase  Outcome: Progressing Towards Goal  Goal: *STG: Complies with medication therapy  Outcome: Progressing Towards Goal  Goal: *STG: Will identify negative impact of chemical dependency including the use of tobacco, alcohol, and other substances  Outcome: Progressing Towards Goal  Goal: *STG: Verbalizes abstinence as an achievable goal  Outcome: Progressing Towards Goal  Goal: *STG: Agrees to participate in outpatient after care program to support ongoing mental health  Outcome: Progressing Towards Goal  Goal: *STG: Able to indentify relapse triggers including interpersonal/social and familial factors  Outcome: Progressing Towards Goal  Goal: *STG: Vital signs within defined limits  Outcome: Progressing Towards Goal  Goal: *STG/LTG: Relapse prevention plan in place to include housing/aftercare, leisure activities, and spirituality  Outcome: Progressing Towards Goal     Problem: Falls - Risk of  Goal: *Absence of Falls  Description: Document Kush Coates Fall Risk and appropriate interventions in the flowsheet.   Outcome: Progressing Towards Goal  Note: Fall Risk Interventions:            Medication Interventions: Bed/chair exit alarm, Patient to call before getting OOB

## 2020-10-10 NOTE — PROGRESS NOTES
[address] [facility] Adult  Hospitalist Group                                                                                          Hospitalist Progress Note  John Tirado MD  Answering service: [phone] OR [phone] from in house phone        Date of Service:  10/10/2020  NAME:  Lenka Aldana  :  1990  MRN:  [id number]    This documentation was facilitated by a Voice Recognition software and may contain inadvertent typographical errors. Admission Summary:   Patient with alcohol abuse,who reportedly quitted drinking last  is admitted for alcohol withdrawal when he presented with visual and auditory hallucination and nausea. Interval history / Subjective:        Patient seen and examined. He is seen in the ER. He notes feeling much better. He tells me he has chronic shakiness in the right hand, and sees shadows out of the right, it has been more than 10 months. Otherwise he denied visual or auditory hallucination. He states his last drink was on   Assessment & Plan:   Alcohol withdrawal/DTs  -CIWA ~1 today.  -Thiamine and folic acid supplement  -Seizure precautions    Alcohol abuse  -Counseled on quitting. He has been to [place] [address] and other counseling. Has been sober previously. Acute etoh hepatitis  -DF 52. Start solumedrol, will complete steroid for 28 days. .Hepatology consulted. Hypokalemia: replacing. Monitor   Hypomagnesemia: replace and monitor   Hypophosphatemia: replacement ordered. Monitor   Coagulopathy w/o bleeding due to alcoholic liver disease. Received vitamin K x3 days. Code status: full  DVT prophylaxis: scd  Care Plan discussed with: Patient/Family  Anticipated Disposition: Home w/Family  Anticipated Discharge: 24 hours to 48 hours   -Could possibly discharge tomorrow.   Hospital Problems  Never Reviewed          Codes Class Noted POA    DTs (delirium tremens) ([facility] ICD-10-CM: J07.029  ICD-9-CM: 291.0  10/8/2020 Unknown                Review of Systems:   A comprehensive review of systems was negative except for that written in the HPI. Vital Signs:    Last 24hrs VS reviewed since prior progress note. Most recent are:  Visit Vitals  /69 (BP 1 Location: Right arm, BP Patient Position: At rest)   Pulse (!) 109   Temp 98.1 °F (36.7 °C)   Resp 17   Wt 77.1 kg (170 lb)   SpO2 96%   BMI 24.39 kg/m²       No intake or output data in the 24 hours ending 10/10/20 1819     Physical Examination:             Constitutional:  No acute distress, cooperative, pleasant    HEENT:  Icteric sclera   Resp:  CTA bilaterally. No wheezing/rhonchi/rales. No accessory muscle use   Chest Wall: No deformity   CV:  Regular rhythm, normal rate, no murmurs, gallops, rubs    GI:  Soft, non distended, non tender.  normoactive bowel sounds, no hepatosplenomegaly    :  No CVA or suprapubic tenderness    Musculoskeletal:  No edema, warm, 2+ pulses throughout    Neurologic:  Mental status:AAOx3,   Cranial nerves II-XII : WNL  Motor exam:Moves all extremities symmetrically  Hands shaky R>L              Data Review:    Review and/or order of clinical lab test  Review and/or order of tests in the radiology section of CPT  Review and/or order of tests in the medicine section of CPT      Labs:     Recent Labs     10/10/20  0144 10/08/20  1424   WBC 5.4 10.9   HGB 9.1* 10.4*   HCT 26.9* 30.8*   * 151     Recent Labs     10/10/20  0144 10/09/20  0522 10/08/20  1530 10/08/20  1424    137  --  135*   K 3.7 3.0*  --  2.8*    103  --  96*   CO2 26 29  --  34*   BUN 8 8  --  7   CREA 0.89 0.92  --  0.98   * 83  --  83   CA 7.0* 7.3*  --  9.1   MG 1.5* 1.0* 1.0*  --    PHOS 1.7* 1.6*  --   --      Recent Labs     10/10/20  0144 10/09/20  0522 10/08/20  1424   ALT 50 53 59   * 178* 188*   TBILI 5.8* 6.3* 8.0*   TP 7.6 7.4 8.5*   ALB 2.2* 2.3* 2.7*   GLOB 5.4* 5.1* 5.8*   LPSE  --  213  --      Recent Labs     10/10/20  0144 10/09/20  0541 10/08/20  1645   INR 2. 6* 2.2* 2.2*   PTP 26.0* 22.2* 21.8*      No results for input(s): FE, TIBC, PSAT, FERR in the last 72 hours. No results found for: FOL, RBCF   No results for input(s): PH, PCO2, PO2 in the last 72 hours. No results for input(s): CPK, CKNDX, TROIQ in the last 72 hours. No lab exists for component: CPKMB  No results found for: CHOL, CHOLX, CHLST, CHOLV, HDL, HDLP, LDL, LDLC, DLDLP, TGLX, TRIGL, TRIGP, CHHD, CHHDX  No results found for: GLUCPOC  No results found for: COLOR, APPRN, SPGRU, REFSG, CHRISTIANO, PROTU, GLUCU, KETU, BILU, UROU, TAMIKO, LEUKU, GLUKE, EPSU, BACTU, WBCU, RBCU, CASTS, UCRY      Medications Reviewed:     Current Facility-Administered Medications   Medication Dose Route Frequency    dextrose 5% - 0.9% NaCl with KCl 20 mEq/L infusion  125 mL/hr IntraVENous CONTINUOUS    methylPREDNISolone (PF) (SOLU-MEDROL) injection 20 mg  20 mg IntraVENous Q12H    phytonadione (vitamin K1) (AQUA-MEPHYTON) 10 mg in 0.9% sodium chloride 50 mL IVPB  10 mg IntraVENous DAILY    hydrOXYzine HCL (ATARAX) tablet 50 mg  50 mg Oral Q6H PRN    sertraline (ZOLOFT) tablet 25 mg  25 mg Oral DAILY    QUEtiapine (SEROquel) tablet 25 mg  25 mg Oral Q6H PRN    diazePAM (VALIUM) tablet 10 mg  10 mg Oral Q1H PRN    diazePAM (VALIUM) tablet 20 mg  20 mg Oral Q1H PRN    LORazepam (ATIVAN) injection 2 mg  2 mg IntraVENous Q1H PRN    LORazepam (ATIVAN) injection 4 mg  4 mg IntraVENous Q1H PRN    0.9% sodium chloride 2,821 mL with folic acid 1 mg, thiamine 100 mg, mvi, adult no. 4 with vit K 10 mL infusion   IntraVENous DAILY    prochlorperazine (COMPAZINE) with saline injection 10 mg  10 mg IntraVENous Q6H PRN    ondansetron (ZOFRAN) injection 4 mg  4 mg IntraVENous Q6H PRN    pantoprazole (PROTONIX) 40 mg in 0.9% sodium chloride 10 mL injection  40 mg IntraVENous Q12H     ______________________________________________________________________  EXPECTED LENGTH OF STAY: - - -  ACTUAL LENGTH OF STAY:          2 Silvia Centeno MD

## 2020-10-10 NOTE — ROUTINE PROCESS
TRANSFER - OUT REPORT: 
 
Verbal report given to RN(name) on Mac Ward  being transferred to (unit) for routine progression of care Report consisted of patients Situation, Background, Assessment and  
Recommendations(SBAR). Information from the following report(s) SBAR, Kardex, Intake/Output, MAR, Accordion, Recent Results and Cardiac Rhythm ST was reviewed with the receiving nurse. Lines:  
Peripheral IV 10/08/20 Left Antecubital (Active) Site Assessment Clean, dry, & intact 10/10/20 1200 Phlebitis Assessment 0 10/10/20 1200 Infiltration Assessment 0 10/10/20 1200 Dressing Status Clean, dry, & intact 10/10/20 1200 Dressing Type Transparent 10/10/20 1200 Hub Color/Line Status Pink;Capped 10/10/20 1200 Action Taken Open ports on tubing capped 10/10/20 1200 Alcohol Cap Used Yes 10/10/20 1200 Peripheral IV 10/09/20 Right Hand (Active) Site Assessment Clean, dry, & intact 10/10/20 1200 Phlebitis Assessment 0 10/10/20 1200 Infiltration Assessment 0 10/10/20 1200 Dressing Status Clean, dry, & intact 10/10/20 1200 Dressing Type Transparent 10/10/20 1200 Hub Color/Line Status Pink;Capped 10/10/20 1200 Action Taken Open ports on tubing capped 10/10/20 1200 Alcohol Cap Used Yes 10/10/20 1200 Opportunity for questions and clarification was provided. Patient transported with: 
 Registered Nurse

## 2020-10-10 NOTE — PROGRESS NOTES
Problem: Alcohol Withdrawal  Goal: *STG: Remains safe in hospital  Outcome: Progressing Towards Goal  Goal: *STG: Seeks staff when symptoms of withdrawal increase  Outcome: Progressing Towards Goal  Goal: *STG: Complies with medication therapy  Outcome: Progressing Towards Goal  Goal: *STG: Attends activities and groups  Outcome: Progressing Towards Goal

## 2020-10-10 NOTE — PROGRESS NOTES
Bedside shift change report given to Krissy (oncoming nurse) by Jose Juan Kang (offgoing nurse). Report included the following information SBAR, Kardex, MAR, Cardiac Rhythm Sinus Tach, Quality Measures and Dual Neuro Assessment.

## 2020-10-11 ENCOUNTER — APPOINTMENT (OUTPATIENT)
Dept: MRI IMAGING | Age: 30
DRG: 896 | End: 2020-10-11
Attending: INTERNAL MEDICINE

## 2020-10-11 VITALS
WEIGHT: 170 LBS | OXYGEN SATURATION: 98 % | RESPIRATION RATE: 17 BRPM | BODY MASS INDEX: 24.39 KG/M2 | SYSTOLIC BLOOD PRESSURE: 130 MMHG | HEART RATE: 103 BPM | TEMPERATURE: 98.6 F | DIASTOLIC BLOOD PRESSURE: 80 MMHG

## 2020-10-11 LAB
ALBUMIN SERPL-MCNC: 2.4 G/DL (ref 3.5–5)
ALBUMIN/GLOB SERPL: 0.4 {RATIO} (ref 1.1–2.2)
ALP SERPL-CCNC: 219 U/L (ref 45–117)
ALT SERPL-CCNC: 54 U/L (ref 12–78)
ANION GAP SERPL CALC-SCNC: 5 MMOL/L (ref 5–15)
AST SERPL-CCNC: 161 U/L (ref 15–37)
BILIRUB SERPL-MCNC: 4.1 MG/DL (ref 0.2–1)
BUN SERPL-MCNC: 8 MG/DL (ref 6–20)
BUN/CREAT SERPL: 9 (ref 12–20)
CALCIUM SERPL-MCNC: 7 MG/DL (ref 8.5–10.1)
CHLORIDE SERPL-SCNC: 112 MMOL/L (ref 97–108)
CO2 SERPL-SCNC: 25 MMOL/L (ref 21–32)
CREAT SERPL-MCNC: 0.91 MG/DL (ref 0.7–1.3)
GLOBULIN SER CALC-MCNC: 5.7 G/DL (ref 2–4)
GLUCOSE SERPL-MCNC: 152 MG/DL (ref 65–100)
INR PPP: 2 (ref 0.9–1.1)
MAGNESIUM SERPL-MCNC: 2.2 MG/DL (ref 1.6–2.4)
PHOSPHATE SERPL-MCNC: 2 MG/DL (ref 2.6–4.7)
POTASSIUM SERPL-SCNC: 4.5 MMOL/L (ref 3.5–5.1)
PROT SERPL-MCNC: 8.1 G/DL (ref 6.4–8.2)
PROTHROMBIN TIME: 19.8 SEC (ref 9–11.1)
SODIUM SERPL-SCNC: 142 MMOL/L (ref 136–145)

## 2020-10-11 PROCEDURE — 77030021566 MRI ABD W WO CONT

## 2020-10-11 PROCEDURE — 74011250636 HC RX REV CODE- 250/636: Performed by: INTERNAL MEDICINE

## 2020-10-11 PROCEDURE — A9585 GADOBUTROL INJECTION: HCPCS | Performed by: HOSPITALIST

## 2020-10-11 PROCEDURE — 74011250637 HC RX REV CODE- 250/637: Performed by: NURSE PRACTITIONER

## 2020-10-11 PROCEDURE — 94760 N-INVAS EAR/PLS OXIMETRY 1: CPT

## 2020-10-11 PROCEDURE — 99232 SBSQ HOSP IP/OBS MODERATE 35: CPT | Performed by: INTERNAL MEDICINE

## 2020-10-11 PROCEDURE — 85610 PROTHROMBIN TIME: CPT

## 2020-10-11 PROCEDURE — 36415 COLL VENOUS BLD VENIPUNCTURE: CPT

## 2020-10-11 PROCEDURE — 80053 COMPREHEN METABOLIC PANEL: CPT

## 2020-10-11 PROCEDURE — 74011000258 HC RX REV CODE- 258: Performed by: INTERNAL MEDICINE

## 2020-10-11 PROCEDURE — 84100 ASSAY OF PHOSPHORUS: CPT

## 2020-10-11 PROCEDURE — 74011250636 HC RX REV CODE- 250/636: Performed by: HOSPITALIST

## 2020-10-11 PROCEDURE — 74011000258 HC RX REV CODE- 258: Performed by: HOSPITALIST

## 2020-10-11 PROCEDURE — 83735 ASSAY OF MAGNESIUM: CPT

## 2020-10-11 PROCEDURE — 74011000250 HC RX REV CODE- 250: Performed by: HOSPITALIST

## 2020-10-11 PROCEDURE — C9113 INJ PANTOPRAZOLE SODIUM, VIA: HCPCS | Performed by: HOSPITALIST

## 2020-10-11 RX ORDER — SODIUM CHLORIDE 0.9 % (FLUSH) 0.9 %
10 SYRINGE (ML) INJECTION
Status: COMPLETED | OUTPATIENT
Start: 2020-10-11 | End: 2020-10-11

## 2020-10-11 RX ORDER — PANTOPRAZOLE SODIUM 40 MG/1
40 TABLET, DELAYED RELEASE ORAL DAILY
Qty: 30 TAB | Refills: 0 | Status: SHIPPED | OUTPATIENT
Start: 2020-10-11 | End: 2020-11-10

## 2020-10-11 RX ORDER — PREDNISONE 20 MG/1
20 TABLET ORAL 2 TIMES DAILY
Qty: 60 TAB | Refills: 0 | Status: SHIPPED | OUTPATIENT
Start: 2020-10-11 | End: 2020-11-10

## 2020-10-11 RX ORDER — SERTRALINE HYDROCHLORIDE 25 MG/1
25 TABLET, FILM COATED ORAL DAILY
Qty: 30 TAB | Refills: 0 | Status: SHIPPED | OUTPATIENT
Start: 2020-10-12 | End: 2020-11-11

## 2020-10-11 RX ADMIN — SODIUM CHLORIDE 40 MG: 9 INJECTION, SOLUTION INTRAMUSCULAR; INTRAVENOUS; SUBCUTANEOUS at 08:09

## 2020-10-11 RX ADMIN — PHYTONADIONE 10 MG: 10 INJECTION, EMULSION INTRAMUSCULAR; INTRAVENOUS; SUBCUTANEOUS at 10:17

## 2020-10-11 RX ADMIN — METHYLPREDNISOLONE SODIUM SUCCINATE 20 MG: 40 INJECTION, POWDER, FOR SOLUTION INTRAMUSCULAR; INTRAVENOUS at 08:08

## 2020-10-11 RX ADMIN — SODIUM CHLORIDE 100 ML: 900 INJECTION, SOLUTION INTRAVENOUS at 08:53

## 2020-10-11 RX ADMIN — FOLIC ACID: 5 INJECTION, SOLUTION INTRAMUSCULAR; INTRAVENOUS; SUBCUTANEOUS at 11:09

## 2020-10-11 RX ADMIN — Medication 10 ML: at 08:53

## 2020-10-11 RX ADMIN — SERTRALINE HYDROCHLORIDE 25 MG: 50 TABLET ORAL at 08:09

## 2020-10-11 RX ADMIN — HYDROXYZINE HYDROCHLORIDE 50 MG: 25 TABLET, FILM COATED ORAL at 10:25

## 2020-10-11 RX ADMIN — GADOBUTROL 7.7 ML: 604.72 INJECTION INTRAVENOUS at 08:53

## 2020-10-11 NOTE — DISCHARGE INSTRUCTIONS
Discharge Instructions       PATIENT ID: Deniz Benjamin  MRN: [id number]   YOB: 1990    DATE OF ADMISSION: 10/8/2020  2:23 PM    DATE OF DISCHARGE: 10/11/2020    PRIMARY CARE PROVIDER: Melissa Alas MD     ATTENDING PHYSICIAN: Ankush Heredia MD  DISCHARGING PROVIDER: Christy Angel MD    To contact this individual call [phone] and ask the  to page. If unavailable ask to be transferred the Adult Hospitalist Department. DISCHARGE DIAGNOSES and CARE RECOMMENDATIONS:   Alcohol abuse  Alcohol induced acute hepatitis   Alcohol withdrawal    ·  We strongly recommend for you to maintain a sober lifestyle and to be active in the recovery community. You may lock into [facility] Substance Abuse partial hospitalization program or inpatient rehab that could be offered from the [facility] or the [facility] as these recommendations would hold the strongest likelihood of success as suggested by the psychiatrist who saw you here. · Take medications as prescribed  · You need follow-up with the liver doctor, Dr. Tony Schneider. Additional recommendations  · Avoid Tylenol use as it is toxic to the liver and you have alcohol induced liver problem    Your prescriptions are sent to the below pharmacy. My Medications      START taking these medications      Instructions Each Dose to Equal Morning Noon Evening Bedtime   pantoprazole 40 mg tablet  Commonly known as:  Protonix    Your last dose was: Your next dose is: Take 1 Tab by mouth daily for 30 days. 40 mg                 predniSONE 20 mg tablet  Commonly known as:  DELTASONE    Your last dose was: Your next dose is: Take 20 mg by mouth two (2) times a day for 30 days. 20 mg                 sertraline 25 mg tablet  Commonly known as:  ZOLOFT  Start taking on:  October 12, 2020    Your last dose was: Your next dose is: Take 1 Tab by mouth daily for 30 days.    25 mg                    CONTINUE taking these medications      Instructions Each Dose to Equal Morning Noon Evening Bedtime   cetirizine 10 mg tablet  Commonly known as:  ZYRTEC    Your last dose was: Your next dose is: Take 10 mg by mouth daily. 10 mg                 therapeutic multivitamin tablet  Commonly known as:  An Silvano Schütt 54 last dose was: Your next dose is: Take 1 Tab by mouth daily. 1 Tab                    STOP taking these medications    Omeprazole delayed release 20 mg tablet  Commonly known as:  PRILOSEC D/R              Where to Get Your Medications      These medications were sent to [facility]/pharmacy #[id number]- [place], [address] [facility]  [address] [facility], [address] [place] [address]    Phone:  [phone]   · pantoprazole 40 mg tablet  · predniSONE 20 mg tablet  · sertraline 25 mg tablet           DIET: Regular Diet      ACTIVITY: Activity as tolerated            PENDING TEST RESULTS:   At the time of discharge the following test results are still pending: None    FOLLOW UP APPOINTMENTS:   Follow-up Information     Follow up With Specialties Details Why Contact Info    Ashlee Marquez MD Gastroenterology, Internal Medicine   [facility]  [address] [place] [address]               YOU WERE SEEN BY THE FOLLOWING CONSULTATIONS:   IP CONSULT TO HOSPITALIST  IP CONSULT TO PSYCHIATRY  IP CONSULT TO HEPATOLOGY    YOU HAD THE FOLLOWING PROCEDURES/SURGERIES  :   * No surgery found *              DISCHARGE MEDICATIONS:   See Medication Reconciliation Form    · It is important that you take the medication exactly as they are prescribed. · Keep your medication in the bottles provided by the pharmacist and keep a list of the medication names, dosages, and times to be taken in your wallet. · Do not take other medications without consulting your doctor.        NOTIFY YOUR PHYSICIAN FOR ANY OF THE FOLLOWING:   Fever over 101 degrees for 24 hours. Chest pain, shortness of breath, fever, chills, nausea, vomiting, diarrhea, change in mentation, falling, weakness, bleeding. Severe pain or pain not relieved by medications. Or, any other signs or symptoms that you may have questions about. Signed:    Silvia Centeno MD  10/11/2020  3:08 PM

## 2020-10-11 NOTE — PROGRESS NOTES
I have reviewed discharge instructions with the patient including (3) prescriptions to be picked up at [facility], f/u appointment with PCP in 7-10 days post hospitalization, outpatient assistance with ETOH abuse and f/u with Dr Angelica Ybarra.   The patient verbalized understanding of all DC instructions

## 2020-10-11 NOTE — PROGRESS NOTES
[address] [facility], MD, [address], Cite Jarrod Dumont, MD Beatrice Abdullahi, AGNIESZKA Turcios, [facility]     Coreen Fall, [facility]   VJ SwensonP-FATMATA    Elisa Mora, [facility]       Robe Rosehoracio Brady [address]    at [address], [address] [place] [address], Omarshayy  22.    [phone]    FAX: [address] [place] [address] Drive, [address], [address]    [phone]    FAX: [phone]         HEPATOLOGY PROGRESS NOTE  The patient is a 27year old  male with a long history of alcohol abuse and IV drug abuse. He stopped using IV drugs in 2015. Entered alcohol rehab, was abstinent from several ears and thought he could drink socialy. This escalated and for the past several months he has been consuming 6 shots per day. This AM he felt poorly, noted his eyes were yellow, vomited and then had a \"panic attack\". He was brought to the ED by his brother. The is no other PMH. TBILI is down to 4 mg. INR is down to 2.0   DF is down to 40. He feels good. Some mild tremor. He can go home today. DC on prednisone 20 mg BID. I will see him in the office later this week. WE will call him Monday/tomorrow    ASSESSMENT AND PLAN:  Alcohol liver disease  The diagnosis is based upon a history of consuming alcohol in excess. The histologic severity has not been defined. The patient is consuming 6 alcoholic beverages daily. Last drink was the day prior to coming to the ED    Discussed the need to stop using alcohol   Will monitor for alcohol withdrawal and DTs    Alcohol hepatitis  The TBILI is down to 6.8. But the INR has increased to 2.6. The DF is down to 40. Still mild tremor but eating well and feels good.     The preliminary read on the MRI was that PV is patent. He can go home today. DC of prednisone 20 mg BID. Coagulopathy  This is secondary to alcoholic hepatitis  He received third dose of Vitamin K today. PV thombosis - spontaneous splenorenal shunt  The US suggests no flow in the PV suggesting PV thrombosis  The CT scan suggests there is a spontaneous splenorenal shunt. Preliminary read on MRI is that there is no PV thrombosus. Will follow-up on this as OP. Would not treat PV thrombosis with anticoagulation at this time anyway in setting of coagulopathy and resolving ETOH hep     Acute pancreatitis  The CT suggests the pancreas is inflamed. Lipase is down to 200/normal    PHYSICAL EXAMINATION:  VS: per nursing note  General:  No acute distress. Eyes:  Sclera icteric  ENT:  No oral lesions. Thyroid normal.  Nodes:  No adenopathy. Skin:  No spider angiomata. No jaundice. Respiratory:  Lungs clear to auscultation. Cardiovascular:  Regular heart rate. Abdomen:  Soft non-tender, No obvious ascites. Extremities:  No lower extremity edema. Neurologic:  Alert and oriented. Cranial nerves grossly intact. No asterixis. LABORATORY:  Results for Sim Monroy (MRN [id number]) as of 10/11/2020 13:52   Ref.  Range 10/8/2020 14:24 10/9/2020 05:22 10/10/2020 01:44 10/11/2020 03:10   WBC Latest Ref Range: 4.1 - 11.1 K/uL 10.9  5.4    HGB Latest Ref Range: 12.1 - 17.0 g/dL 10.4 (L)  9.1 (L)    PLATELET Latest Ref Range: 150 - 400 K/uL 151  126 (L)    INR Latest Ref Range: 0.9 - 1.1   2.2 (H) 2.2 (H) 2.6 (H) 2.0 (H)   Sodium Latest Ref Range: 136 - 145 mmol/L 135 (L) 137 139 142   Potassium Latest Ref Range: 3.5 - 5.1 mmol/L 2.8 (L) 3.0 (L) 3.7 4.5   Chloride Latest Ref Range: 97 - 108 mmol/L 96 (L) 103 107 112 (H)   CO2 Latest Ref Range: 21 - 32 mmol/L 34 (H) 29 26 25   Glucose Latest Ref Range: 65 - 100 mg/dL 83 83 148 (H) 152 (H)   BUN Latest Ref Range: 6 - 20 MG/DL 7 8 8 8   Creatinine Latest Ref Range: 0.70 - 1.30 MG/DL 0.98 0.92 0.89 0.91   Bilirubin, total Latest Ref Range: 0.2 - 1.0 MG/DL 8.0 (H) 6.3 (H) 5.8 (H) 4.1 (H)   Albumin Latest Ref Range: 3.5 - 5.0 g/dL 2.7 (L) 2.3 (L) 2.2 (L) 2.4 (L)   ALT Latest Ref Range: 12 - 78 U/L 59 53 50 54   AST Latest Ref Range: 15 - 37 U/L 244 (H) 206 (H) 198 (H) 161 (H)   Alk. phosphatase Latest Ref Range: 45 - 117 U/L 188 (H) 178 (H) 176 (H) 219 (H)   Lipase Latest Ref Range: 73 - 393 U/L  213         RADIOLOGY:  10/2020. CT scan abdomen without IV contrast.  Changes consistent with cirrhosis. No liver mass lesions. No dilated bile ducts. Spontaneous splenorenal shunt. No ascites. 10/2020. Ultrasound of liver. Echogenic consistent with fatty liver. No liver mass lesions. Possible portal vein thrombosis. No dilated bile ducts. No ascites.         MD Bimal Mckinley [facility] [address] of [address], [address] [facility] 22.  [phone]  [address]

## 2020-10-12 NOTE — DISCHARGE SUMMARY
Discharge Summary       PATIENT ID: Jumana Swenson  MRN: [id number]   YOB: 1990    DATE OF ADMISSION: 10/8/2020  2:23 PM    DATE OF DISCHARGE: 10/11/2020   PRIMARY CARE PROVIDER: Jesus Vaughan MD     ATTENDING PHYSICIAN: Eran Vora MD    DISCHARGING PROVIDER: Eran Vora MD    To contact this individual call [phone] and ask the  to page. If unavailable ask to be transferred the Adult Hospitalist Department. CONSULTATIONS: IP CONSULT TO PSYCHIATRY  IP CONSULT TO HEPATOLOGY    PROCEDURES/SURGERIES: * No surgery found *    ADMITTING DIAGNOSES & HOSPITAL COURSE:   Patient with alcohol abuse,who reportedly quitted drinking last Sunday is admitted for alcohol withdrawal when he presented with visual and auditory hallucination and nausea. Alcohol withdrawal/DTs patient did not display much of a withdrawal symptom, has chronic tremor more pronounced dead on the right. He received thiamine folic acid. He was extensively counseled to quit drinking. Patient had been sober for 2 years and he feels strongly that he would continue to be sober       Alcohol abuse  -Counseled on quitting. He has been to [place] [address] and other counseling. Has been sober previously. Acute etoh hepatitis  -Discussed with the pathologist who recommended discharging on Solu-Medrol for 30 days. Hypokalemia: replacing. Monitor   Hypomagnesemia: replace and monitor   Hypophosphatemia: replacement ordered. Monitor   Coagulopathy w/o bleeding due to alcoholic liver disease. Received vitamin K x3 days. Unspecified mood disorder: She was evaluated by psychiatrist recommended sertraline.              DISCHARGE DIAGNOSES / PLAN:    Alcohol abuse  Alcohol hepatitis  Alcohol withdrawal  Coagulopathy due to alcohol liver disease  Electrolyte derangement: Hypokalemia, hypomagnesemia      PENDING TEST RESULTS:   At the time of discharge the following test results are still pending: None    FOLLOW UP APPOINTMENTS: Follow-up Information     Follow up With Specialties Details Why Contact Info    Sabino Buchanan MD Gastroenterology, Internal Medicine   [address] [facility] [address]   [phone]      Other, MD Soraya    Patient can only remember the practice name and not the physician               DIET: Regular Diet    ACTIVITY: Activity as tolerated          DISCHARGE MEDICATIONS:  Discharge Medication List as of 10/11/2020  3:32 PM      START taking these medications    Details   sertraline (ZOLOFT) 25 mg tablet Take 1 Tab by mouth daily for 30 days. , Normal, Disp-30 Tab,R-0      predniSONE (DELTASONE) 20 mg tablet Take 20 mg by mouth two (2) times a day for 30 days. , Normal, Disp-60 Tab,R-0      pantoprazole (Protonix) 40 mg tablet Take 1 Tab by mouth daily for 30 days. , Normal, Disp-30 Tab,R-0         CONTINUE these medications which have NOT CHANGED    Details   cetirizine (ZYRTEC) 10 mg tablet Take 10 mg by mouth daily. , Historical Med      therapeutic multivitamin (THERAGRAN) tablet Take 1 Tab by mouth daily. , Historical Med         STOP taking these medications       Omeprazole delayed release (PRILOSEC D/R) 20 mg tablet Comments:   Reason for Stopping:                 NOTIFY YOUR PHYSICIAN FOR ANY OF THE FOLLOWING:   Fever over 101 degrees for 24 hours. Chest pain, shortness of breath, fever, chills, nausea, vomiting, diarrhea, change in mentation, falling, weakness, bleeding. Severe pain or pain not relieved by medications. Or, any other signs or symptoms that you may have questions about.     DISPOSITION:  x  Home With:   OT  PT  HH  RN       Long term SNF/Inpatient Rehab    Independent/assisted living    Hospice    Other:       PATIENT CONDITION AT DISCHARGE:     Functional status    Poor     Deconditioned    x Independent      Cognition   x Lucid     Forgetful     Dementia      Catheters/lines (plus indication)    Bell     PICC     PEG    x None      Code status    x Full code     DNR PHYSICAL EXAMINATION AT DISCHARGE:  General:          Alert, cooperative, no distress, appears stated age. HEENT:           Atraumatic, anicteric sclerae, pink conjunctivae                          No oral ulcers, mucosa moist, throat clear, dentition fair  Neck:               Supple, symmetrical  Lungs:             Clear to auscultation bilaterally. No Wheezing or Rhonchi. No rales. Chest wall:      No tenderness  No Accessory muscle use. Heart:              Regular  rhythm,  No  murmur   No edema  Abdomen:        Soft, non-tender. Not distended. Bowel sounds normal  Extremities:     No cyanosis. No clubbing,                            Skin turgor normal, Capillary refill normal  Skin:                Not pale. Not Jaundiced  No rashes   Psych:             Not anxious or agitated. Neurologic:      Alert, moves all extremities, answers questions appropriately and responds to commands       CHRONIC MEDICAL DIAGNOSES:  Problem List as of 10/11/2020 Never Reviewed          Codes Class Noted - Resolved    DTs (delirium tremens) ([facility].) ICD-10-CM: E60.935  ICD-9-CM: 291.0  10/8/2020 - Present              Greater than 30 minutes were spent with the patient on counseling and coordination of care    Signed:    Modesta Magana MD  10/12/2020  6:49 AM

## 2020-10-13 ENCOUNTER — TELEPHONE (OUTPATIENT)
Dept: HEMATOLOGY | Age: 30
End: 2020-10-13

## 2020-10-13 NOTE — TELEPHONE ENCOUNTER
----- Message from Roberto Carlos Gandhi Rodolfo Alcantar sent at 10/13/2020 10:26 AM EDT -----  Regarding: FW: DC from [facility]. Needs FU appt with me or Dr Landeros Punch or Fri  Schedule this patient With Dr. Bambi Doe on Friday at [place] [address] and put in appt detail \"per MLS/CFW\"  ----- Message -----  From: Tressa Leblanc MD  Sent: 10/12/2020   8:53 AM EDT  To: Roberto Carlos Oksana Alcantar  Subject: DC from [facility].   Needs FU appt with me or Dr HOLLY Collado

## 2020-10-13 NOTE — TELEPHONE ENCOUNTER
Called, left vm in regards to [facility] appt for pt to return call to office.

## 2020-10-15 NOTE — TELEPHONE ENCOUNTER
Two pt identifiers confirmed. Pt offered and accepted appt for down below:  Future Appointments   Date Time Provider Wes Coker   10/23/2020  3:30 PM Doc MD Yuki LIVR BS [facility]       Pt verbalized understanding of information discussed w/ no further questions at this time.

## 2020-10-23 PROBLEM — F10.10 ALCOHOL ABUSE: Status: ACTIVE | Noted: 2020-10-23

## 2020-10-23 PROBLEM — K70.10 ALCOHOLIC HEPATITIS: Status: ACTIVE | Noted: 2020-10-23

## 2020-10-23 PROBLEM — Z72.0 TOBACCO USE: Status: ACTIVE | Noted: 2020-10-23

## 2020-10-23 PROBLEM — F41.0 PANIC ATTACK: Status: ACTIVE | Noted: 2020-10-23

## 2020-10-23 PROBLEM — F32.A ANXIETY AND DEPRESSION: Status: ACTIVE | Noted: 2020-10-23

## 2020-10-23 PROBLEM — F41.9 ANXIETY AND DEPRESSION: Status: ACTIVE | Noted: 2020-10-23

## 2020-10-23 PROBLEM — K70.9 ALCOHOLIC LIVER DISEASE (HCC): Status: ACTIVE | Noted: 2020-10-23

## 2020-10-30 ENCOUNTER — OFFICE VISIT (OUTPATIENT)
Dept: HEMATOLOGY | Age: 30
End: 2020-10-30

## 2020-10-30 VITALS
HEART RATE: 96 BPM | RESPIRATION RATE: 18 BRPM | DIASTOLIC BLOOD PRESSURE: 64 MMHG | HEIGHT: 70 IN | WEIGHT: 173.4 LBS | BODY MASS INDEX: 24.82 KG/M2 | OXYGEN SATURATION: 95 % | SYSTOLIC BLOOD PRESSURE: 122 MMHG | TEMPERATURE: 98 F

## 2020-10-30 DIAGNOSIS — K70.31 ALCOHOLIC CIRRHOSIS OF LIVER WITH ASCITES (HCC): Primary | ICD-10-CM

## 2020-10-30 DIAGNOSIS — R18.8 CIRRHOSIS OF LIVER WITH ASCITES, UNSPECIFIED HEPATIC CIRRHOSIS TYPE (HCC): ICD-10-CM

## 2020-10-30 DIAGNOSIS — K74.60 CIRRHOSIS OF LIVER WITH ASCITES, UNSPECIFIED HEPATIC CIRRHOSIS TYPE (HCC): ICD-10-CM

## 2020-10-30 LAB
ALBUMIN SERPL-MCNC: 2.5 G/DL (ref 3.5–5)
ALBUMIN/GLOB SERPL: 0.5 {RATIO} (ref 1.1–2.2)
ALP SERPL-CCNC: 129 U/L (ref 45–117)
ALT SERPL-CCNC: 133 U/L (ref 12–78)
ANION GAP SERPL CALC-SCNC: 7 MMOL/L (ref 5–15)
AST SERPL-CCNC: 141 U/L (ref 15–37)
BASOPHILS # BLD: 0 K/UL (ref 0–0.1)
BASOPHILS NFR BLD: 0 % (ref 0–1)
BILIRUB SERPL-MCNC: 3.5 MG/DL (ref 0.2–1)
BUN SERPL-MCNC: 16 MG/DL (ref 6–20)
BUN/CREAT SERPL: 22 (ref 12–20)
CALCIUM SERPL-MCNC: 8.4 MG/DL (ref 8.5–10.1)
CHLORIDE SERPL-SCNC: 101 MMOL/L (ref 97–108)
CO2 SERPL-SCNC: 29 MMOL/L (ref 21–32)
CREAT SERPL-MCNC: 0.73 MG/DL (ref 0.7–1.3)
DIFFERENTIAL METHOD BLD: ABNORMAL
EOSINOPHIL # BLD: 0.6 K/UL (ref 0–0.4)
EOSINOPHIL NFR BLD: 3 % (ref 0–7)
ERYTHROCYTE [DISTWIDTH] IN BLOOD BY AUTOMATED COUNT: 16.1 % (ref 11.5–14.5)
GLOBULIN SER CALC-MCNC: 4.6 G/DL (ref 2–4)
GLUCOSE SERPL-MCNC: 87 MG/DL (ref 65–100)
HBV SURFACE AB SER QL: REACTIVE
HBV SURFACE AB SER-ACNC: 10.51 MIU/ML
HBV SURFACE AG SER QL: <0.1 INDEX
HBV SURFACE AG SER QL: NEGATIVE
HCT VFR BLD AUTO: 31.7 % (ref 36.6–50.3)
HGB BLD-MCNC: 10.6 G/DL (ref 12.1–17)
HIV 1+2 AB+HIV1 P24 AG SERPL QL IA: NONREACTIVE
HIV12 RESULT COMMENT, HHIVC: NORMAL
IMM GRANULOCYTES # BLD AUTO: 0.1 K/UL (ref 0–0.04)
IMM GRANULOCYTES NFR BLD AUTO: 1 % (ref 0–0.5)
INR PPP: 1.5 (ref 0.9–1.1)
LYMPHOCYTES # BLD: 1.9 K/UL (ref 0.8–3.5)
LYMPHOCYTES NFR BLD: 11 % (ref 12–49)
MCH RBC QN AUTO: 34.2 PG (ref 26–34)
MCHC RBC AUTO-ENTMCNC: 33.4 G/DL (ref 30–36.5)
MCV RBC AUTO: 102.3 FL (ref 80–99)
MONOCYTES # BLD: 1.7 K/UL (ref 0–1)
MONOCYTES NFR BLD: 10 % (ref 5–13)
NEUTS SEG # BLD: 13.6 K/UL (ref 1.8–8)
NEUTS SEG NFR BLD: 75 % (ref 32–75)
NRBC # BLD: 0 K/UL (ref 0–0.01)
NRBC BLD-RTO: 0 PER 100 WBC
PLATELET # BLD AUTO: 187 K/UL (ref 150–400)
PMV BLD AUTO: 10.4 FL (ref 8.9–12.9)
POTASSIUM SERPL-SCNC: 4.1 MMOL/L (ref 3.5–5.1)
PROT SERPL-MCNC: 7.1 G/DL (ref 6.4–8.2)
PROTHROMBIN TIME: 15.4 SEC (ref 9–11.1)
RBC # BLD AUTO: 3.1 M/UL (ref 4.1–5.7)
SODIUM SERPL-SCNC: 137 MMOL/L (ref 136–145)
WBC # BLD AUTO: 18 K/UL (ref 4.1–11.1)

## 2020-10-30 PROCEDURE — 99214 OFFICE O/P EST MOD 30 MIN: CPT | Performed by: HOSPITALIST

## 2020-10-30 NOTE — PROGRESS NOTES
Identified pt with two pt identifiers(name and ). Reviewed record in preparation for visit and have obtained necessary documentation. Chief Complaint   Patient presents with    Other     Alcoholic Hepatitis    Other     Alcoholic Liver Disease      Vitals:    10/30/20 1408   BP: 122/64   Pulse: 96   Resp: 18   Temp: 98 °F (36.7 °C)   TempSrc: Temporal   SpO2: 95%   Weight: 173 lb 6.4 oz (78.7 kg)   Height: 5' 10\" (1.778 m)   PainSc:   0 - No pain       Health Maintenance Review: Patient reminded of \"due or due soon\" health maintenance. I have asked the patient to contact his/her primary care provider (PCP) for follow-up on his/her health maintenance. Coordination of Care Questionnaire:  :   1) Have you been to an emergency room, urgent care, or hospitalized since your last visit? If yes, where when, and reason for visit? no       2. Have seen or consulted any other health care provider since your last visit? If yes, where when, and reason for visit? NO      Patient is accompanied by self I have received verbal consent from [facility]  to discuss any/all medical information while they are present in the room.

## 2020-10-30 NOTE — PATIENT INSTRUCTIONS
Continue current dose of diuretics- lasix 40 mg daily and spironolactone 100 mg daily Continue prednisone for 10 more days then begin to taper to 20 mg daily for 3 days then 10 mg daily for 3 days and then stop. You are encouraged to enrol in an alcohol relapse counseling program 
 
 
Return to clinic in 4 weeks

## 2020-10-30 NOTE — PROGRESS NOTES
[facility] [address]      Robina Guido MD, Bess Levin, Gretta Zheng MD, MPH      JIGNA Carranza-FATMATA Lu, [facility]     April S Juan David, [facility]   Kacie Addison, [facility]    Masoud Espinoza, [facility]       [facility]ira [facility] [address]    at [address], [address] Yemi Vaca  22.    [phone]    FAX: [address] [place] [address], [address]    [phone]    FAX: [phone]     Patient Care Team:  Other, Juan A Billings MD as PCP - General    Problem List  Date Reviewed: 10/30/2020          Codes Class Noted    Alcohol abuse ICD-10-CM: F10.10  ICD-9-CM: 305.00  06/23/3248        Alcoholic hepatitis JPX-11-AF: K70.10  ICD-9-CM: 571.1  10/23/2020        Tobacco use ICD-10-CM: Z72.0  ICD-9-CM: 305.1  79/39/8435        Alcoholic cirrhosis of liver with ascites ([facility] ICD-10-CM: K70.31  ICD-9-CM: 571.2  10/23/2020        Panic attack ICD-10-CM: F41.0  ICD-9-CM: 300.01  10/23/2020        Anxiety and depression ICD-10-CM: F41.9, F32.9  ICD-9-CM: 300.00, 311  10/23/2020            Mac Ward is being seen at [facility] for management of alcohol associated cirrhosis  The active problem list, all pertinent past medical history, medications, radiologic findings and laboratory findings related to the liver disorder were reviewed with the patient. The patient is a 27 y.o.  male who was found to have elevated  liver enzymes in 10/2020 during recent hospital admission for alcohol hepatitis. Patient was admitted on 10/820 and discharged 10/11/20 for alcohol withdrawal and delirium tremens because he recently stopped drinking alcohol. Patient normally drinks about 10- 12 drinks of liquor daily.  He started drinking since age 15 years. His last alcohol intake was on 10/4/2020    Liver enzymes performed on admission showed , ALT 50, , TB 5.8    Serologic evaluation for markers of chronic liver disease was not performed. Liver ultrasound performed on 10/08 demonstrated cirrhosis with moderate hepatic steatosis.      Subsequent MRI of the liver performed on 10/10 demonstrated cirrhosis with hepatic steatosis and portal hypertensive changes. An assessment of liver fibrosis with biopsy or elasto graphy was not performed    The patient reports abdominal swelling with severe lower extremity swelling. He states he has gained about 20 pounds over the last few days. He reports mild problems with concentration and itching. He denies hematemesis or hematocehiza. The patient has moderate functional limitations due to bilateral lower extremity swelling. He ambulates with a cane     Interval History  Patient was last seen in the clinic 10/23/20. He was started on diuretics due to massive lower extremity edema and ascites  Today he states he feels overall better and that lower extremity edema is improving. He is currently on lasix 40 mg and aldactone 100 mg dailt. He denies abdominal pain over the liver although he states he reports random abdominal pain that comes on and off. No hematemesis or hematochezia. Jaundice is clearing. He is on prednisone 20 mg BID for alcohol hepatitis. Prednisone was started on 10/12 for a total duration of 28 days. He has not had any alcoholic beverages since prior to last admission. He is still looking to start alcohol relapse counseling program.    Patient is ambulatory but has mild functional limitations due to lower extremity edema.       ASSESSMENT AND PLAN:  Alcohol associated Cirrhosis:   The diagnosis of cirrhosis is based on laboratory studies, imaging and complications of cirrhosis  We will perform additional serologies to rule out other causes of chronic liver disease  MELD-Na score is 16.  Patient is not a candidate for liver transplantation evaluation testing due to ongoing alcohol abuse and polysubstance abuse    Severe Alcohol hepatitis  There is elevation in liver transaminases in a pattern consistent with alcohol. DF> 32  Continue prednisone 20 mg BID for a total duration of 28 days and then taper to 20 mg daily for 3 days and then 10 mg for 3 days and then stop. The patient was advised to stop all alcohol consumption and that if there is ever a return to consuming alcohol there will be more severe liver injury with less alcohol intake. Large volume Ascites   This is secondary to cirrhosis. This is improving  Continue step 1 diuretics- Lasix 40 mg daily and aldactone 100 mg daily  Continue low salt diet- < 2000 mg per day    Severe bilateral lower extremity edema  Continue step 1 diuretics    Screening for Esophageal varices  This will be deferred for now and addressed in the future. There is no evidence of bleeding    Screening for Hepatocellular Carcinoma  HCC screening has recently been performed and does not suggest [facility] The next liver imaging study will be performed in 04/2021    Treatment of other medical problems in patients with chronic liver disease  There are no contraindications for the patient to take most medications that are necessary for treatment of other medical issues. The patient has cirrhrosis and should avoid NSAIDs which are associated with a higher rate of developing BHUPINDER. The patient can take any medications utilized for treatment of DM. Statins to treat hypercholesterolemia  The patient consumes alcohol on a daily basis or has recently stopped consuming alcohol. Regular alcohol use increases the risk of toxicity from acetaminophen. This analgesic should be avoided until the patient has been abstinent from alcohol for 6 months.       Counseling for alcohol in patients with chronic liver disease  The patient has cirrhosis and was advised to be abstinent from all alcohol including non-alcoholic beer which does contain some alcohol. Patient was counseled to enrol in alcohol relapse counseling programe    Vaccinations   Vaccination for viral hepatitis A is recommended since the patient has no serologic evidence of previous exposure or vaccination with immunity. Vaccination for viral hepatitis B is not needed. The patient has serologic evidence of prior exposure or vaccination with immunity. Routine vaccinations against other bacterial and viral agents can be performed as indicated. Annual flu vaccination should be administered if indicated. 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  spironolactone (ALDACTONE) 100 mg tablet Take 1 Tab by mouth daily. Indications: accumulation of fluid caused by cirrhosis of the liver    cetirizine (ZYRTEC) 10 mg tablet Take 10 mg by mouth daily.  therapeutic multivitamin (THERAGRAN) tablet Take 1 Tab by mouth daily. No current facility-administered medications for this visit. SYSTEM REVIEW NOT RELATED TO LIVER DISEASE OR REVIEWED ABOVE:  Constitution systems: Negative for fever, chills, weight gain, weight loss. Eyes: Negative for visual changes. ENT: Negative for sore throat, painful swallowing. Respiratory: Negative for cough, hemoptysis, SOB. Cardiology: Negative for chest pain, palpitations. GI:  Negative for constipation or diarrhea. : Negative for urinary frequency, dysuria, hematuria, nocturia. Skin: Negative for rash. Hematology: Negative for easy bruising, blood clots. Musculo-skelatal: Negative for back pain, muscle pain, weakness.   Neurologic: Negative for headaches, dizziness, vertigo, memory problems not related to HE. Psychology: Negative for anxiety, depression. FAMILY HISTORY:  The patient has no knowledge of the father's medical condition. The mother Has the following chronic disease(s): Migraines, Left eye blindness, HTN  There is no family history of liver disease. There is no family history of immune disorders. SOCIAL HISTORY:  The patient is single  The patient has no children. 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 Last used IV drug in 10/10/2014  The patient consumes alcohol in excess. He drinks 10-12 drinks of liquor daily   The patient is unemployed. He was an       PHYSICAL EXAMINATION:  Visit Vitals  /64 (BP 1 Location: Right arm, BP Patient Position: Sitting)   Pulse 96   Temp 98 °F (36.7 °C) (Temporal)   Resp 18   Ht 5' 10\" (1.778 m)   Wt 173 lb 6.4 oz (78.7 kg)   SpO2 95%   BMI 24.88 kg/m²     General: No acute distress. Chronically ill looking  Eyes: Sclera icteric. ENT: No oral lesions. Thyroid normal.  Nodes: No adenopathy. Skin: No spider angiomata. Positive jaundice. Positive palmar erythema. Respiratory: Lungs clear to auscultation. Cardiovascular: Regular heart rate. No murmurs. No JVD. Abdomen: Soft non-tender. Hepatomegaly. Spleen not palpable. Abdomen is distended with obvious ascites. Extremities: 3/4 plus edema. No muscle wasting. No gross arthritic changes. Neurologic: Alert and oriented. Cranial nerves grossly intact. No asterixis. LABORATORY STUDIES:  Recent liver function panel, CBC with platelet count and BMP are not available. These studies will be performed.     Liver [place] of [address] Units 10/30/2020 10/11/2020   WBC 4.1 - 11.1 K/uL 18.0 (H)    ANC 1.8 - 8.0 K/UL 13.6 (H)    HGB 12.1 - 17.0 g/dL 10.6 (L)     - 400 K/uL 187    INR 0.9 - 1.1   1.5 (H) 2.0 (H)   AST 15 - 37 U/L 141 (H) 161 (H)   ALT 12 - 78 U/L 133 (H) 54   Alk Phos 45 - 117 U/L 129 (H) 219 (H)   Bili, Total 0.2 - 1.0 MG/DL 3.5 (H) 4.1 (H)   Albumin 3.5 - 5.0 g/dL 2.5 (L) 2.4 (L)   BUN 6 - 20 MG/DL 16 8   Creat 0.70 - 1.30 MG/DL 0.73 0.91   Na 136 - 145 mmol/L 137 142   K 3.5 - 5.1 mmol/L 4.1 4.5   Cl 97 - 108 mmol/L 101 112 (H)   CO2 21 - 32 mmol/L 29 25   Glucose 65 - 100 mg/dL 87 152 (H)   Magnesium 1.6 - 2.4 mg/dL  2.2     SEROLOGIES:  Serologies Latest Ref Rng & Units 10/30/2020   Hep A Ab, Total Negative   Negative   Hep B Surface Ag Index <0.10   Hep B Surface Ag Interp Negative   Negative   Hep B Core Ab, Total Negative   Negative   Hep B Surface Ab mIU/mL 10.51   Hep B Surface Ab Interp NONREACTIVE   REACTIVE (A)   Hep C Ab 0.0 - 0.9 s/co ratio >11.0 (H)     LIVER HISTOLOGY:  Not available or performed    ENDOSCOPIC PROCEDURES:  Not available or performed    RADIOLOGY:  10/8: Liver ultrasound:cirrhosis with moderate hepatic steatosis. Cirrhosis and Splenomegaly is further evidence of portal hypertension.   10/10: MRI with and without contrast of liver: cirrhosis with hepatic steatosis and portal hypertensive changes  without portal vein thrombosis. Small right pleural effusion, small ascites. OTHER TESTING:  Not available or performed    FOLLOW-UP:  All of the issues listed above in the Assessment and Plan were discussed with the patient. All questions were answered. The patient expressed a clear understanding of the above. [address] [place] [address] in 4 weeks to review all data and determine the treatment plan.     Satish Arriaga MD, MPH  Advanced Hepatology  [facility] 13 of [address] [address] [address], [address] [facility] 22.  [phone]  [address]

## 2020-10-31 LAB
HAV AB SER QL IA: NEGATIVE
HBV CORE AB SERPL QL IA: NEGATIVE
HCV AB S/CO SERPL IA: >11 S/CO RATIO (ref 0–0.9)
HCV AB S/CO SERPL IA: NORMAL

## 2020-11-30 ENCOUNTER — OFFICE VISIT (OUTPATIENT)
Dept: HEMATOLOGY | Age: 30
End: 2020-11-30

## 2020-11-30 VITALS
SYSTOLIC BLOOD PRESSURE: 112 MMHG | WEIGHT: 166 LBS | HEIGHT: 70 IN | RESPIRATION RATE: 18 BRPM | DIASTOLIC BLOOD PRESSURE: 53 MMHG | TEMPERATURE: 98 F | BODY MASS INDEX: 23.77 KG/M2 | HEART RATE: 89 BPM | OXYGEN SATURATION: 97 %

## 2020-11-30 DIAGNOSIS — B18.2 CHRONIC HEPATITIS C WITHOUT HEPATIC COMA (HCC): Primary | ICD-10-CM

## 2020-11-30 PROCEDURE — 99214 OFFICE O/P EST MOD 30 MIN: CPT | Performed by: HOSPITALIST

## 2020-11-30 NOTE — PATIENT INSTRUCTIONS
We recommend you stop diuretics We will perform blood work today Return to clinic in 4 weeks for HCV treatment

## 2020-11-30 NOTE — PROGRESS NOTES
Identified pt with two pt identifiers(name and ). Reviewed record in preparation for visit and have obtained necessary documentation. Chief Complaint   Patient presents with    Other     Alcoholic Hepatitis    Cirrhosis Of Liver     Alcoholic Cirrhosis      Vitals:    20 1108   BP: (!) 112/53   Pulse: 89   Resp: 18   Temp: 98 °F (36.7 °C)   TempSrc: Temporal   SpO2: 97%   Weight: 166 lb (75.3 kg)   Height: 5' 10\" (1.778 m)   PainSc:   0 - No pain       Health Maintenance Review: Patient reminded of \"due or due soon\" health maintenance. I have asked the patient to contact his/her primary care provider (PCP) for follow-up on his/her health maintenance. Coordination of Care Questionnaire:  :   1) Have you been to an emergency room, urgent care, or hospitalized since your last visit? If yes, where when, and reason for visit? no       2. Have seen or consulted any other health care provider since your last visit? If yes, where when, and reason for visit? NO      Patient is accompanied by self I have received verbal consent from Mercy Vega to discuss any/all medical information while they are present in the room.

## 2020-11-30 NOTE — PROGRESS NOTES
[facility] [address]      Kelly Solano MD, Marc Bonilla, Negro Castro MD, MPH      Eufemia Pinedo, AGNIESZKA Goodwino, [facility]     April S Juan David, [facility]   Melanie King, FNP-C    Cayetano Benton, [facility]       [email or web address]do [facility] [address]    at [address], [address] [facility], [facility] 22.    [phone]    FAX: [address] [place] [address], [address], [address]    [phone]    FAX: [phone]     Patient Care Team:  Other, Doreen Schneider MD as PCP - General    Problem List  Date Reviewed: 11/15/2020          Codes Class Noted    Alcohol abuse ICD-10-CM: F10.10  ICD-9-CM: 305.00  02/34/6051        Alcoholic hepatitis PUC-19-IP: K70.10  ICD-9-CM: 571.1  10/23/2020        Tobacco use ICD-10-CM: Z72.0  ICD-9-CM: 305.1  62/33/1547        Alcoholic cirrhosis of liver with ascites ([facility] ICD-10-CM: K70.31  ICD-9-CM: 571.2  10/23/2020        Panic attack ICD-10-CM: F41.0  ICD-9-CM: 300.01  10/23/2020        Anxiety and depression ICD-10-CM: F41.9, F32.9  ICD-9-CM: 300.00, 311  10/23/2020            Anthony Porras returns to the [address] for management of alcohol associated cirrhosis and chronic HCV  The active problem list, all pertinent past medical history, medications, radiologic findings and laboratory findings related to the liver disorder were reviewed with the patient. The patient is a 27 y.o.  male who was found to have elevated  liver enzymes in 10/2020 during recent hospital admission for alcohol hepatitis. Patient was admitted on 10/820 and discharged 10/11/20 for alcohol withdrawal and delirium tremens because he recently stopped drinking alcohol. Patient normally drinks about 10- 12 drinks of liquor daily.  He started drinking since age 15 years. His last alcohol intake was on 10/4/2020    Liver enzymes performed on admission showed , ALT 50, , TB 5.8    Serologic evaluation for markers of chronic liver disease performed in the clinic was positive for chronic HCV. Liver ultrasound performed on 10/08 demonstrated cirrhosis with moderate hepatic steatosis.      Subsequent MRI of the liver performed on 10/10 demonstrated cirrhosis with hepatic steatosis and portal hypertensive changes. An assessment of liver fibrosis with biopsy or elasto graphy was not performed    The patient reports abdominal swelling with severe lower extremity swelling. He states he has gained about 20 pounds over the last few days. He reports mild problems with concentration and itching. He denies hematemesis or hematocehiza. Interval History  Patient was last seen in the clinic 10/30. He is feeling so much better compared to the last time he was in clinic  Ascites and lower extremity swelling has now resolved. He is able to put on his shoes. He is on lasix 40 mg daily and aldactone 100 mg daily and also adherent to low salt diet. Prednisone therapy has been tapered off. Blood work performed on last clinic visit was positive for HCV Ab. Patient does not have any new complaints at this time. Patient is ambulatory and does not have any functional limitations      ASSESSMENT AND PLAN:  Alcohol associated Cirrhosis:   The diagnosis of cirrhosis is based on laboratory studies, imaging and complications of cirrhosis  MELD score is 16    Severe Alcohol hepatitis  There is elevation in liver transaminases in a pattern consistent with alcohol. DF was > 32  Patient has completed 28 day therapy with prednisone. Large volume Ascites   This is now resolved on step 1 diuretics  We will discontinue lasix and aldactone. Severe bilateral lower extremity edema  Resolved.  Discontinue diuretics    Screening for Esophageal varices  This will be deferred for now. Screening for Hepatocellular Carcinoma  HCC screening has recently been performed with MRI liver and there was no focal hepatic lesion    The next liver imaging study will be performed in 04/2021    Treatment of other medical problems in patients with chronic liver disease  There are no contraindications for the patient to take most medications that are necessary for treatment of other medical issues. The patient has cirrhrosis and should avoid NSAIDs which are associated with a higher rate of developing BHUPINDER. The patient can take any medications utilized for treatment of DM. Statins to treat hypercholesterolemia  The patient consumes alcohol on a daily basis or has recently stopped consuming alcohol. Regular alcohol use increases the risk of toxicity from acetaminophen. This analgesic should be avoided until the patient has been abstinent from alcohol for 6 months. Counseling for alcohol in patients with chronic liver disease  Patient has been abstinent from all alcoholic beverages since 06/9379. Chronic HCV  Will perform HCV viral load, and HCV genotype to define the specific treatment and duration of treatment that will be required. Hepatitis B virus serology was recently performed and was negative. Patient is immune to HBV   We will screen patient for HIV  The patient should be treated with Harvoni (sofasbuvir and ledipasvir) Mavyret (glecaprevir and piprentasvir) or Epclusa (sofosbuvir and valpitasvir) for 8-12 weeks  The SVR/cure rate is over 95%. Vaccinations   Vaccination for viral hepatitis A is recommended since the patient has no serologic evidence of previous exposure or vaccination with immunity. Vaccination for viral hepatitis B is not needed. The patient has serologic evidence of prior exposure or vaccination with immunity. Routine vaccinations against other bacterial and viral agents can be performed as indicated.   Annual flu vaccination should be administered if indicated. 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  spironolactone (ALDACTONE) 100 mg tablet Take 1 Tab by mouth daily. Indications: accumulation of fluid caused by cirrhosis of the liver    cetirizine (ZYRTEC) 10 mg tablet Take 10 mg by mouth daily.  therapeutic multivitamin (THERAGRAN) tablet Take 1 Tab by mouth daily. No current facility-administered medications for this visit. SYSTEM REVIEW NOT RELATED TO LIVER DISEASE OR REVIEWED ABOVE:  Constitution systems: Negative for fever, chills, weight gain, weight loss. Eyes: Negative for visual changes. ENT: Negative for sore throat, painful swallowing. Respiratory: Negative for cough, hemoptysis, SOB. Cardiology: Negative for chest pain, palpitations. GI:  Negative for constipation or diarrhea. : Negative for urinary frequency, dysuria, hematuria, nocturia. Skin: Negative for rash. Hematology: Negative for easy bruising, blood clots. Musculo-skelatal: Negative for back pain, muscle pain, weakness. Neurologic: Negative for headaches, dizziness, vertigo, memory problems not related to HE. Psychology: Negative for anxiety, depression. FAMILY HISTORY:  The patient has no knowledge of the father's medical condition. The mother Has the following chronic disease(s): Migraines, Left eye blindness, HTN  There is no family history of liver disease. There is no family history of immune disorders. SOCIAL HISTORY:  The patient is single  The patient has no children. 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  Last used IV drug in 10/10/2014  The patient consumes alcohol in excess. He drinks 10-12 drinks of liquor daily   The patient is unemployed. He was an       PHYSICAL EXAMINATION:  Visit Vitals  BP (!) 112/53 (BP 1 Location: Right arm, BP Patient Position: Sitting)   Pulse 89   Temp 98 °F (36.7 °C) (Temporal)   Resp 18   Ht 5' 10\" (1.778 m)   Wt 166 lb (75.3 kg)   SpO2 97%   BMI 23.82 kg/m²     General: No acute distress. Chronically ill looking  Eyes: Sclera icteric. ENT: No oral lesions. Thyroid normal.  Nodes: No adenopathy. Skin: No spider angiomata. Positive jaundice. Positive palmar erythema. Respiratory: Lungs clear to auscultation. Cardiovascular: Regular heart rate. No murmurs. No JVD. Abdomen: Soft non-tender. Hepatomegaly. Spleen not palpable. Abdomen is distended with obvious ascites. Extremities: 3/4 plus edema. No muscle wasting. No gross arthritic changes. Neurologic: Alert and oriented. Cranial nerves grossly intact. No asterixis. LABORATORY STUDIES:  Recent liver function panel, CBC with platelet count and BMP are not available. These studies will be performed.     Liver [place] of [address] [place] [address] Ref Rng & Units 11/30/2020 10/30/2020   WBC 4.1 - 11.1 K/uL 4.4 18.0 (H)   ANC 1.8 - 8.0 K/UL 2.0 13.6 (H)   HGB 12.1 - 17.0 g/dL 10.8 (L) 10.6 (L)    - 400 K/uL 145 (L) 187   INR 0.9 - 1.1   1.5 (H) 1.5 (H)   AST 15 - 37 U/L 105 (H) 141 (H)   ALT 12 - 78 U/L 61 133 (H)   Alk Phos 45 - 117 U/L 137 (H) 129 (H)   Bili, Total 0.2 - 1.0 MG/DL 4.9 (H) 3.5 (H)   Albumin 3.5 - 5.0 g/dL 2.6 (L) 2.5 (L)   BUN 6 - 20 MG/DL 8 16   Creat 0.70 - 1.30 MG/DL 0.79 0.73   Na 136 - 145 mmol/L 133 (L) 137   K 3.5 - 5.1 mmol/L 3.9 4.1   Cl 97 - 108 mmol/L 100 101   CO2 21 - 32 mmol/L 29 29   Glucose 65 - 100 mg/dL 102 (H) 87   Magnesium 1.6 - 2.4 mg/dL       SEROLOGIES:  Serologies Latest Ref Rng & Units 10/30/2020   Hep A Ab, Total Negative   Negative   Hep B Surface Ag Index <0.10   Hep B Surface Ag Interp Negative   Negative   Hep B Core Ab, Total Negative   Negative   Hep B Surface Ab mIU/mL 10.51   Hep B Surface Ab Interp NONREACTIVE   REACTIVE (A)   Hep C Ab 0.0 - 0.9 s/co ratio >11.0 (H)     LIVER HISTOLOGY:  Not available or performed    ENDOSCOPIC PROCEDURES:  Not available or performed    RADIOLOGY:  10/8: Liver ultrasound:cirrhosis with moderate hepatic steatosis. Cirrhosis and Splenomegaly is further evidence of portal hypertension.   10/10: MRI with and without contrast of liver: cirrhosis with hepatic steatosis and portal hypertensive changes  without portal vein thrombosis. Small right pleural effusion, small ascites. OTHER TESTING:  Not available or performed    FOLLOW-UP:  All of the issues listed above in the Assessment and Plan were discussed with the patient. All questions were answered. The patient expressed a clear understanding of the above.     [address] [place] [address] in 4 weeks to review all data and for treatment of chronic HCV    Dearl Cranker, MD, MPH  Advanced Hepatology  [facility] of [address] [place] [address] A, [address] [place] [address] 22.  [phone]  [address] [place] [address]

## 2020-12-03 LAB
HCV GENTYP SERPL NAA+PROBE: NORMAL
HCV RNA SERPL NAA+PROBE-ACNC: NORMAL IU/ML
HCV RNA SERPL NAA+PROBE-LOG IU: 4.2 LOG10 IU/ML
PLEASE NOTE, 550474: NORMAL
TEST INFORMATION:, 550031: NORMAL

## 2020-12-04 LAB
ALBUMIN SERPL-MCNC: 2.6 G/DL (ref 3.5–5)
ALBUMIN/GLOB SERPL: 0.6 {RATIO} (ref 1.1–2.2)
ALP SERPL-CCNC: 137 U/L (ref 45–117)
ALT SERPL-CCNC: 61 U/L (ref 12–78)
ANION GAP SERPL CALC-SCNC: 4 MMOL/L (ref 5–15)
AST SERPL-CCNC: 105 U/L (ref 15–37)
BASOPHILS # BLD: 0.1 K/UL (ref 0–0.1)
BASOPHILS NFR BLD: 1 % (ref 0–1)
BILIRUB SERPL-MCNC: 4.9 MG/DL (ref 0.2–1)
BUN SERPL-MCNC: 8 MG/DL (ref 6–20)
BUN/CREAT SERPL: 10 (ref 12–20)
CALCIUM SERPL-MCNC: 8.8 MG/DL (ref 8.5–10.1)
CHLORIDE SERPL-SCNC: 100 MMOL/L (ref 97–108)
CO2 SERPL-SCNC: 29 MMOL/L (ref 21–32)
CREAT SERPL-MCNC: 0.79 MG/DL (ref 0.7–1.3)
DIFFERENTIAL METHOD BLD: ABNORMAL
EOSINOPHIL # BLD: 0.2 K/UL (ref 0–0.4)
EOSINOPHIL NFR BLD: 4 % (ref 0–7)
ERYTHROCYTE [DISTWIDTH] IN BLOOD BY AUTOMATED COUNT: 14.1 % (ref 11.5–14.5)
GLOBULIN SER CALC-MCNC: 4.2 G/DL (ref 2–4)
GLUCOSE SERPL-MCNC: 102 MG/DL (ref 65–100)
HCT VFR BLD AUTO: 31.7 % (ref 36.6–50.3)
HCV RNA SERPL QL NAA+PROBE: POSITIVE
HGB BLD-MCNC: 10.8 G/DL (ref 12.1–17)
HIV 1+2 AB+HIV1 P24 AG SERPL QL IA: NONREACTIVE
HIV12 RESULT COMMENT, HHIVC: NORMAL
IMM GRANULOCYTES # BLD AUTO: 0 K/UL (ref 0–0.04)
IMM GRANULOCYTES NFR BLD AUTO: 1 % (ref 0–0.5)
INR PPP: 1.5 (ref 0.9–1.1)
LYMPHOCYTES # BLD: 1.4 K/UL (ref 0.8–3.5)
LYMPHOCYTES NFR BLD: 32 % (ref 12–49)
MCH RBC QN AUTO: 33 PG (ref 26–34)
MCHC RBC AUTO-ENTMCNC: 34.1 G/DL (ref 30–36.5)
MCV RBC AUTO: 96.9 FL (ref 80–99)
MONOCYTES # BLD: 0.8 K/UL (ref 0–1)
MONOCYTES NFR BLD: 17 % (ref 5–13)
NEUTS SEG # BLD: 2 K/UL (ref 1.8–8)
NEUTS SEG NFR BLD: 45 % (ref 32–75)
NRBC # BLD: 0 K/UL (ref 0–0.01)
NRBC BLD-RTO: 0 PER 100 WBC
PLATELET # BLD AUTO: 145 K/UL (ref 150–400)
PMV BLD AUTO: 10.3 FL (ref 8.9–12.9)
POTASSIUM SERPL-SCNC: 3.9 MMOL/L (ref 3.5–5.1)
PROT SERPL-MCNC: 6.8 G/DL (ref 6.4–8.2)
PROTHROMBIN TIME: 15.6 SEC (ref 9–11.1)
RBC # BLD AUTO: 3.27 M/UL (ref 4.1–5.7)
SODIUM SERPL-SCNC: 133 MMOL/L (ref 136–145)
WBC # BLD AUTO: 4.4 K/UL (ref 4.1–11.1)

## 2021-09-03 ENCOUNTER — TRANSCRIBE ORDER (OUTPATIENT)
Dept: SCHEDULING | Age: 31
End: 2021-09-03

## 2021-09-03 ENCOUNTER — HOSPITAL ENCOUNTER (OUTPATIENT)
Age: 31
Setting detail: OBSERVATION
Discharge: HOME | End: 2021-09-04
Attending: EMERGENCY MEDICINE | Admitting: HOSPITALIST
Payer: COMMERCIAL

## 2021-09-03 DIAGNOSIS — D64.9 ANEMIA REQUIRING TRANSFUSIONS: Primary | ICD-10-CM

## 2021-09-03 DIAGNOSIS — R19.09 INGUINAL MASS: Primary | ICD-10-CM

## 2021-09-03 LAB
ALBUMIN SERPL-MCNC: 2.8 G/DL (ref 3.5–5)
ALBUMIN/GLOB SERPL: 0.9 {RATIO} (ref 1.1–2.2)
ALP SERPL-CCNC: 122 U/L (ref 45–117)
ALT SERPL-CCNC: 43 U/L (ref 12–78)
ANION GAP SERPL CALC-SCNC: 6 MMOL/L (ref 5–15)
AST SERPL-CCNC: 64 U/L (ref 15–37)
BASOPHILS # BLD: 0.1 K/UL (ref 0–0.1)
BASOPHILS NFR BLD: 1 % (ref 0–1)
BILIRUB SERPL-MCNC: 0.8 MG/DL (ref 0.2–1)
BUN SERPL-MCNC: 12 MG/DL (ref 6–20)
BUN/CREAT SERPL: 15 (ref 12–20)
CALCIUM SERPL-MCNC: 8.1 MG/DL (ref 8.5–10.1)
CHLORIDE SERPL-SCNC: 103 MMOL/L (ref 97–108)
CO2 SERPL-SCNC: 28 MMOL/L (ref 21–32)
COMMENT, HOLDF: NORMAL
CREAT SERPL-MCNC: 0.78 MG/DL (ref 0.7–1.3)
DIFFERENTIAL METHOD BLD: ABNORMAL
EOSINOPHIL # BLD: 0.2 K/UL (ref 0–0.4)
EOSINOPHIL NFR BLD: 3 % (ref 0–7)
ERYTHROCYTE [DISTWIDTH] IN BLOOD BY AUTOMATED COUNT: 23.2 % (ref 11.5–14.5)
FERRITIN SERPL-MCNC: 6 NG/ML (ref 26–388)
FOLATE SERPL-MCNC: 18.7 NG/ML (ref 5–21)
GLOBULIN SER CALC-MCNC: 3.1 G/DL (ref 2–4)
GLUCOSE SERPL-MCNC: 102 MG/DL (ref 65–100)
HCT VFR BLD AUTO: 21.8 % (ref 36.6–50.3)
HGB BLD-MCNC: 6.3 G/DL (ref 12.1–17)
HISTORY CHECKED?,CKHIST: NORMAL
IMM GRANULOCYTES # BLD AUTO: 0 K/UL (ref 0–0.04)
IMM GRANULOCYTES NFR BLD AUTO: 0 % (ref 0–0.5)
INR PPP: 1.3 (ref 0.9–1.1)
IRON SATN MFR SERPL: 3 % (ref 20–50)
IRON SERPL-MCNC: 13 UG/DL (ref 35–150)
LYMPHOCYTES # BLD: 1.1 K/UL (ref 0.8–3.5)
LYMPHOCYTES NFR BLD: 21 % (ref 12–49)
MAGNESIUM SERPL-MCNC: 2 MG/DL (ref 1.6–2.4)
MCH RBC QN AUTO: 20.7 PG (ref 26–34)
MCHC RBC AUTO-ENTMCNC: 28.9 G/DL (ref 30–36.5)
MCV RBC AUTO: 71.7 FL (ref 80–99)
MONOCYTES # BLD: 0.8 K/UL (ref 0–1)
MONOCYTES NFR BLD: 15 % (ref 5–13)
NEUTS SEG # BLD: 2.9 K/UL (ref 1.8–8)
NEUTS SEG NFR BLD: 60 % (ref 32–75)
NRBC # BLD: 0 K/UL (ref 0–0.01)
NRBC BLD-RTO: 0 PER 100 WBC
PLATELET # BLD AUTO: 214 K/UL (ref 150–400)
PMV BLD AUTO: 9.4 FL (ref 8.9–12.9)
POTASSIUM SERPL-SCNC: 4.1 MMOL/L (ref 3.5–5.1)
PROT SERPL-MCNC: 5.9 G/DL (ref 6.4–8.2)
PROTHROMBIN TIME: 13 SEC (ref 9–11.1)
RBC # BLD AUTO: 3.04 M/UL (ref 4.1–5.7)
RBC MORPH BLD: ABNORMAL
RETICS # AUTO: 0.05 M/UL (ref 0.03–0.1)
RETICS/RBC NFR AUTO: 1.6 % (ref 0.7–2.1)
SAMPLES BEING HELD,HOLD: NORMAL
SODIUM SERPL-SCNC: 137 MMOL/L (ref 136–145)
TIBC SERPL-MCNC: 438 UG/DL (ref 250–450)
VIT B12 SERPL-MCNC: 1384 PG/ML (ref 193–986)
WBC # BLD AUTO: 5.1 K/UL (ref 4.1–11.1)

## 2021-09-03 PROCEDURE — 65270000032 HC RM SEMIPRIVATE

## 2021-09-03 PROCEDURE — 82607 VITAMIN B-12: CPT

## 2021-09-03 PROCEDURE — 80053 COMPREHEN METABOLIC PANEL: CPT

## 2021-09-03 PROCEDURE — 74011000250 HC RX REV CODE- 250: Performed by: HOSPITALIST

## 2021-09-03 PROCEDURE — 99283 EMERGENCY DEPT VISIT LOW MDM: CPT

## 2021-09-03 PROCEDURE — 86923 COMPATIBILITY TEST ELECTRIC: CPT

## 2021-09-03 PROCEDURE — 82746 ASSAY OF FOLIC ACID SERUM: CPT

## 2021-09-03 PROCEDURE — 83540 ASSAY OF IRON: CPT

## 2021-09-03 PROCEDURE — 74011000258 HC RX REV CODE- 258: Performed by: HOSPITALIST

## 2021-09-03 PROCEDURE — 82728 ASSAY OF FERRITIN: CPT

## 2021-09-03 PROCEDURE — 74011250636 HC RX REV CODE- 250/636: Performed by: HOSPITALIST

## 2021-09-03 PROCEDURE — P9016 RBC LEUKOCYTES REDUCED: HCPCS

## 2021-09-03 PROCEDURE — 83735 ASSAY OF MAGNESIUM: CPT

## 2021-09-03 PROCEDURE — 74011250637 HC RX REV CODE- 250/637: Performed by: NURSE PRACTITIONER

## 2021-09-03 PROCEDURE — C9113 INJ PANTOPRAZOLE SODIUM, VIA: HCPCS | Performed by: HOSPITALIST

## 2021-09-03 PROCEDURE — 86900 BLOOD TYPING SEROLOGIC ABO: CPT

## 2021-09-03 PROCEDURE — 99218 HC RM OBSERVATION: CPT

## 2021-09-03 PROCEDURE — 85045 AUTOMATED RETICULOCYTE COUNT: CPT

## 2021-09-03 PROCEDURE — 36430 TRANSFUSION BLD/BLD COMPNT: CPT

## 2021-09-03 PROCEDURE — 36415 COLL VENOUS BLD VENIPUNCTURE: CPT

## 2021-09-03 PROCEDURE — 85025 COMPLETE CBC W/AUTO DIFF WBC: CPT

## 2021-09-03 PROCEDURE — 85610 PROTHROMBIN TIME: CPT

## 2021-09-03 RX ORDER — SPIRONOLACTONE 100 MG/1
100 TABLET, FILM COATED ORAL DAILY
COMMUNITY
Start: 2021-08-04 | End: 2021-11-04 | Stop reason: SDUPTHER

## 2021-09-03 RX ORDER — RISPERIDONE 1 MG/1
1 TABLET, FILM COATED ORAL
Status: DISCONTINUED | OUTPATIENT
Start: 2021-09-03 | End: 2021-09-04 | Stop reason: HOSPADM

## 2021-09-03 RX ORDER — SODIUM CHLORIDE 9 MG/ML
250 INJECTION, SOLUTION INTRAVENOUS AS NEEDED
Status: DISCONTINUED | OUTPATIENT
Start: 2021-09-03 | End: 2021-09-04 | Stop reason: HOSPADM

## 2021-09-03 RX ORDER — SODIUM CHLORIDE 9 MG/ML
75 INJECTION, SOLUTION INTRAVENOUS CONTINUOUS
Status: DISCONTINUED | OUTPATIENT
Start: 2021-09-03 | End: 2021-09-04 | Stop reason: HOSPADM

## 2021-09-03 RX ORDER — POLYETHYLENE GLYCOL 3350 17 G/17G
17 POWDER, FOR SOLUTION ORAL DAILY PRN
Status: DISCONTINUED | OUTPATIENT
Start: 2021-09-03 | End: 2021-09-04 | Stop reason: HOSPADM

## 2021-09-03 RX ORDER — SODIUM CHLORIDE 0.9 % (FLUSH) 0.9 %
5-40 SYRINGE (ML) INJECTION EVERY 8 HOURS
Status: DISCONTINUED | OUTPATIENT
Start: 2021-09-03 | End: 2021-09-04 | Stop reason: HOSPADM

## 2021-09-03 RX ORDER — ONDANSETRON 4 MG/1
4 TABLET, ORALLY DISINTEGRATING ORAL
Status: DISCONTINUED | OUTPATIENT
Start: 2021-09-03 | End: 2021-09-04 | Stop reason: HOSPADM

## 2021-09-03 RX ORDER — LAMOTRIGINE 100 MG/1
100 TABLET ORAL DAILY
COMMUNITY
Start: 2021-08-06

## 2021-09-03 RX ORDER — ONDANSETRON 2 MG/ML
4 INJECTION INTRAMUSCULAR; INTRAVENOUS
Status: DISCONTINUED | OUTPATIENT
Start: 2021-09-03 | End: 2021-09-04 | Stop reason: HOSPADM

## 2021-09-03 RX ORDER — FUROSEMIDE 40 MG/1
40 TABLET ORAL DAILY
COMMUNITY
Start: 2021-08-04 | End: 2021-11-04 | Stop reason: SDUPTHER

## 2021-09-03 RX ORDER — ACETAMINOPHEN 325 MG/1
650 TABLET ORAL
Status: DISCONTINUED | OUTPATIENT
Start: 2021-09-03 | End: 2021-09-04 | Stop reason: HOSPADM

## 2021-09-03 RX ORDER — RISPERIDONE 1 MG/1
1 TABLET, FILM COATED ORAL
COMMUNITY
Start: 2021-08-06

## 2021-09-03 RX ORDER — ACETAMINOPHEN 650 MG/1
650 SUPPOSITORY RECTAL
Status: DISCONTINUED | OUTPATIENT
Start: 2021-09-03 | End: 2021-09-04 | Stop reason: HOSPADM

## 2021-09-03 RX ORDER — VENLAFAXINE HYDROCHLORIDE 75 MG/1
75 CAPSULE, EXTENDED RELEASE ORAL DAILY
COMMUNITY
Start: 2021-07-27

## 2021-09-03 RX ORDER — SODIUM CHLORIDE 0.9 % (FLUSH) 0.9 %
5-40 SYRINGE (ML) INJECTION AS NEEDED
Status: DISCONTINUED | OUTPATIENT
Start: 2021-09-03 | End: 2021-09-04 | Stop reason: HOSPADM

## 2021-09-03 RX ADMIN — SODIUM CHLORIDE 40 MG: 9 INJECTION INTRAMUSCULAR; INTRAVENOUS; SUBCUTANEOUS at 18:27

## 2021-09-03 RX ADMIN — RISPERIDONE 1 MG: 1 TABLET ORAL at 22:02

## 2021-09-03 RX ADMIN — SODIUM CHLORIDE 75 ML/HR: 9 INJECTION, SOLUTION INTRAVENOUS at 18:30

## 2021-09-03 RX ADMIN — IRON SUCROSE 100 MG: 20 INJECTION, SOLUTION INTRAVENOUS at 18:29

## 2021-09-03 NOTE — H&P
History & Physical    Primary Care Provider: Soraya Burden MD  Source of Information: Patient     Please note that this dictation was completed with [employer], the computer voice recognition software. Quite often unanticipated grammatical, syntax, homophones, and other interpretive errors are inadvertently transcribed by the computer software. Please disregard these errors. Please excuse any errors that have escaped final proofreading. History of Presenting Illness:   Naomie Ceballos is a 32 y.o. male who presents with a severe anemia. Patient has past medical history of IV drug use hep C untreated and cirrhosis of liver. Patient told me he went to a Covid clinic was tested for Covid antibody he has been vaccinated and at the ER the blood work showed he has severe anemia. He was sent to emergency room for further management found to have hemoglobin of 6.3 admitted for further management. Patient denies any chest pain shortness of breath nausea vomiting diarrhea, patient also denies any hematochezia or blood in the stool recently. Patient used to be a IV drug user and a heavy alcoholic but quit January 2021 quit IV drug use 2014. Currently patient works as an  he has several scratch marks and bite-like marks all over the body. Patient was admitted for further management. Patient takes risperidone lamotrigine and venlafaxine for psychiatric issues. He denies taking any antibiotics recently or any bleeding issues. The patient denies any fever, chills, chest pain, cough, congestion, recent illness, palpitations, or dysuria. Review of Systems:  Pertinent items are noted in the History of Present Illness. Past Medical History:   Diagnosis Date    IVDU (intravenous drug user)       No past surgical history on file. Prior to Admission medications    Medication Sig Start Date End Date Taking?  Authorizing Provider   cetirizine (ZYRTEC) 10 mg tablet Take 10 mg by mouth daily. Provider, Historical   therapeutic multivitamin (THERAGRAN) tablet Take 1 Tab by mouth daily. Provider, Historical     Allergies   Allergen Reactions    Opioids - Morphine Analogues Other (comments)     Pt. States Former Heroin addict; no opioids    Other Medication Other (comments)     opiods      No family history on file. SOCIAL HISTORY:  Patient resides:  Independently x   Assisted Living    SNF    With family care       Smoking history:   None x   Former    Chronic      Alcohol history:   None x   Social    Chronic      Ambulates:   Independently x   w/cane    w/walker    w/wc    CODE STATUS:  DNR    Full x   Other      Objective:     Physical Exam:     Visit Vitals  /65 (BP 1 Location: Left upper arm, BP Patient Position: At rest)   Pulse 92   Temp 98.3 °F (36.8 °C)   Resp 15   Ht 5' 10\" (1.778 m)   Wt 68.5 kg (151 lb)   SpO2 99%   BMI 21.67 kg/m²           General:  Alert, cooperative, no distress, appears stated age. Head:  Normocephalic, without obvious abnormality, atraumatic. Eyes:  Conjunctivae/corneas clear. PERRL, EOMs intact. Nose: Nares normal. Septum midline. Mucosa normal. No drainage or sinus tenderness. Throat: Lips, mucosa, and tongue normal. Teeth and gums normal.   Neck: Supple, symmetrical, trachea midline, no adenopathy, thyroid: no enlargement/tenderness/nodules,       Lungs:   Clear to auscultation bilaterally. Heart:  Regular rate and rhythm, S1, S2 normal, no murmur, click, rub or gallop. Abdomen:   Soft, non-tender. Bowel sounds normal. No masses,  No organomegaly. Extremities: Extremities normal, atraumatic, no cyanosis or edema. Pulses: 2+ and symmetric all extremities. Skin: Skin color, texture, turgor normal. No rashes or lesions   Neurologic: CNII-XII intact. EKG:  normal sinus rhythm.       Data Review:     Recent Days:  Recent Labs     09/03/21  1536   WBC 5.1   HGB 6.3*   HCT 21.8*        Recent Labs 09/03/21  1536      K 4.1      CO2 28   *   BUN 12   CREA 0.78   CA 8.1*   MG 2.0   ALB 2.8*   TBILI 0.8   ALT 43   INR 1.3*     No results for input(s): PH, PCO2, PO2, HCO3, FIO2 in the last 72 hours. 24 Hour Results:  Recent Results (from the past 24 hour(s))   CBC WITH AUTOMATED DIFF    Collection Time: 09/03/21  3:36 PM   Result Value Ref Range    WBC 5.1 4.1 - 11.1 K/uL    RBC 3.04 (L) 4.10 - 5.70 M/uL    HGB 6.3 (L) 12.1 - 17.0 g/dL    HCT 21.8 (L) 36.6 - 50.3 %    MCV 71.7 (L) 80.0 - 99.0 FL    MCH 20.7 (L) 26.0 - 34.0 PG    MCHC 28.9 (L) 30.0 - 36.5 g/dL    RDW 23.2 (H) 11.5 - 14.5 %    PLATELET 574 514 - 738 K/uL    MPV 9.4 8.9 - 12.9 FL    NRBC 0.0 0  WBC    ABSOLUTE NRBC 0.00 0.00 - 0.01 K/uL    NEUTROPHILS 60 32 - 75 %    LYMPHOCYTES 21 12 - 49 %    MONOCYTES 15 (H) 5 - 13 %    EOSINOPHILS 3 0 - 7 %    BASOPHILS 1 0 - 1 %    IMMATURE GRANULOCYTES 0 0.0 - 0.5 %    ABS. NEUTROPHILS 2.9 1.8 - 8.0 K/UL    ABS. LYMPHOCYTES 1.1 0.8 - 3.5 K/UL    ABS. MONOCYTES 0.8 0.0 - 1.0 K/UL    ABS. EOSINOPHILS 0.2 0.0 - 0.4 K/UL    ABS. BASOPHILS 0.1 0.0 - 0.1 K/UL    ABS. IMM.  GRANS. 0.0 0.00 - 0.04 K/UL    DF SMEAR SCANNED      RBC COMMENTS HYPOCHROMIA  2+        RBC COMMENTS ANISOCYTOSIS  3+        RBC COMMENTS POIKILOCYTOSIS  PRESENT        RBC COMMENTS SCHISTOCYTES  PRESENT        RBC COMMENTS POLYCHROMASIA  1+       METABOLIC PANEL, COMPREHENSIVE    Collection Time: 09/03/21  3:36 PM   Result Value Ref Range    Sodium 137 136 - 145 mmol/L    Potassium 4.1 3.5 - 5.1 mmol/L    Chloride 103 97 - 108 mmol/L    CO2 28 21 - 32 mmol/L    Anion gap 6 5 - 15 mmol/L    Glucose 102 (H) 65 - 100 mg/dL    BUN 12 6 - 20 MG/DL    Creatinine 0.78 0.70 - 1.30 MG/DL    BUN/Creatinine ratio 15 12 - 20      GFR est AA >60 >60 ml/min/1.73m2    GFR est non-AA >60 >60 ml/min/1.73m2    Calcium 8.1 (L) 8.5 - 10.1 MG/DL    Bilirubin, total 0.8 0.2 - 1.0 MG/DL    ALT (SGPT) 43 12 - 78 U/L    AST (SGOT) 64 (H) 15 - 37 U/L    Alk. phosphatase 122 (H) 45 - 117 U/L    Protein, total 5.9 (L) 6.4 - 8.2 g/dL    Albumin 2.8 (L) 3.5 - 5.0 g/dL    Globulin 3.1 2.0 - 4.0 g/dL    A-G Ratio 0.9 (L) 1.1 - 2.2     SAMPLES BEING HELD    Collection Time: 09/03/21  3:36 PM   Result Value Ref Range    SAMPLES BEING HELD 1RED     COMMENT        Add-on orders for these samples will be processed based on acceptable specimen integrity and analyte stability, which may vary by analyte. PROTHROMBIN TIME + INR    Collection Time: 09/03/21  3:36 PM   Result Value Ref Range    INR 1.3 (H) 0.9 - 1.1      Prothrombin time 13.0 (H) 9.0 - 11.1 sec   TYPE & SCREEN    Collection Time: 09/03/21  3:36 PM   Result Value Ref Range    Crossmatch Expiration 09/06/2021,2359     ABO/Rh(D) PENDING     Antibody screen PENDING    MAGNESIUM    Collection Time: 09/03/21  3:36 PM   Result Value Ref Range    Magnesium 2.0 1.6 - 2.4 mg/dL   RBC, ALLOCATE    Collection Time: 09/03/21  4:45 PM   Result Value Ref Range    HISTORY CHECKED? Historical check performed          Imaging:     Radiology    Admit to inpatient status      Patient was explained about the risk of admission including and not a complete list including risk of falls,fractures,blood clots,allergic reactions,infections. Patient/family also understands and agrees to the treatment plan including medications and side effect profiles and also understand the risk with radiation while undergoing imaging studies. The patient and the family/friends (after permission given by the patient to discuss) understand this and agree with the admission plan. Assessment:     Principal Problem:    Anemia (9/3/2021)           Plan:     1.   Severe anemia POA  -Differential diagnosis includes bone marrow suppression /iron deficiency /B12 folate deficiency /and GI bleed  We will admit the patient to medical  Get iron profile B12 folate ferritin and reticulocyte count, may need bone marrow biopsy  We will get a stool occult if positive will need GI consult start PPI  MCV MCHC -low possibly iron deficient we will start Venofer  Blood transfusion 1 unit ordered verbally taken consent for blood transfusion    2. Psychiatric disorder continue home medication obtain medication dose restart requested med rec    3. History of cirrhosis of liver currently patient said he is not taking alcohol since January 2020  Supportive care conservative management repeat LFTs INR slightly elevated possibly due to cirrhosis    4. History of hep C untreated need outpatient follow-up with hepatology    5. Patient said that he is fully Covid vaccinated    6.   DVT prophylaxis SCDs full code hematology consult       Signed By: Jonah Rodriguez MD     September 3, 2021

## 2021-09-03 NOTE — ED TRIAGE NOTES
TRIAGE NOTE:  Patient arrives from his PCP office with lab work stating his hemoglobin is 6.2. He has had cirrhosis of the liver in the past, reports he quit drinking. Reports feeling okay today, has no complaints. Will repeat blood work, and place IV.

## 2021-09-03 NOTE — ED PROVIDER NOTES
43-year-old male with a history of alcoholic cirrhosis presents with a chief complaint of low hemoglobin. Patient states that he had outpatient labs drawn on the 18th which showed a hemoglobin of less than 7. He has not had any black or bloody stools. He denies any history of esophageal varices. He has not had any other bleeding of note. He does endorse occasional lightheadedness but denies shortness of breath or chest pain. Past Medical History:   Diagnosis Date    IVDU (intravenous drug user)        No past surgical history on file. No family history on file. Social History     Socioeconomic History    Marital status: SINGLE     Spouse name: Not on file    Number of children: Not on file    Years of education: Not on file    Highest education level: Not on file   Occupational History    Not on file   Tobacco Use    Smoking status: Current Every Day Smoker     Packs/day: 0.10    Smokeless tobacco: Former User   Substance and Sexual Activity    Alcohol use: Not Currently     Comment: 14-18 drinks/week    Drug use: Yes     Types: Marijuana     Comment: no longer using heroin; 6 years off heroin    Sexual activity: Yes     Partners: Female, Male     Birth control/protection: Condom   Other Topics Concern    Not on file   Social History Narrative    Not on file     Social Determinants of Health     Financial Resource Strain:     Difficulty of Paying Living Expenses:    Food Insecurity:     Worried About Running Out of Food in the Last Year:     [address] in the Last Year:    Transportation Needs:     Lack of Transportation (Medical):      Lack of Transportation (Non-Medical):    Physical Activity:     Days of Exercise per Week:     Minutes of Exercise per Session:    Stress:     Feeling of Stress :    Social Connections:     Frequency of Communication with Friends and Family:     Frequency of Social Gatherings with Friends and Family:     Attends Shinto Services:  Active Member of Clubs or Organizations:     Attends Club or Organization Meetings:     Marital Status:    Intimate Partner Violence:     Fear of Current or Ex-Partner:     Emotionally Abused:     Physically Abused:     Sexually Abused: ALLERGIES: Opioids - morphine analogues and Other medication    Review of Systems   Constitutional: Negative for fever. HENT: Negative for rhinorrhea. Respiratory: Negative for cough. Cardiovascular: Negative for chest pain. Gastrointestinal: Negative for abdominal pain. Genitourinary: Negative for dysuria. Musculoskeletal: Negative for back pain. Skin: Negative for wound. Neurological: Positive for light-headedness. Negative for headaches. Psychiatric/Behavioral: Negative for confusion. Vitals:    09/03/21 1456   BP: 116/65   Pulse: 92   Resp: 15   Temp: 98.3 °F (36.8 °C)   SpO2: 99%   Weight: 68.5 kg (151 lb)   Height: 5' 10\" (1.778 m)            Physical Exam  Vitals and nursing note reviewed. Constitutional:       General: He is not in acute distress. Appearance: Normal appearance. He is not ill-appearing, toxic-appearing or diaphoretic. HENT:      Head: Normocephalic. Eyes:      Extraocular Movements: Extraocular movements intact. Cardiovascular:      Rate and Rhythm: Normal rate. Pulses: Normal pulses. Pulmonary:      Effort: Pulmonary effort is normal. No respiratory distress. Abdominal:      General: There is no distension. Musculoskeletal:         General: Normal range of motion. Cervical back: Normal range of motion. Skin:     General: Skin is dry. Capillary Refill: Capillary refill takes less than 2 seconds. Neurological:      Mental Status: He is alert and oriented to person, place, and time.    Psychiatric:         Mood and Affect: Mood normal.          MDM  Number of Diagnoses or Management Options  Anemia requiring transfusions  Diagnosis management comments: Labs demonstrate microcytic anemia with a hemoglobin of less than 7. Patient will be transfused PRBCs and admitted for further work-up. He is comfortable and agreeable with plan of care.     Perfect Serve Consult for Admission  4:36 PM    ED Room Number: R39/R39  Patient Name and age:  Army Moreira 32 y.o.  male  Working Diagnosis: Anemia requiring transfusions  (primary encounter diagnosis)    COVID-19 Suspicion:  no  Sepsis present:  no  Reassessment needed: no  Code Status:  Full Code  Readmission: no  Isolation Requirements:  no  Recommended Level of Care:  telemetry  Department:[facility] Adult ED - 21            Amount and/or Complexity of Data Reviewed  Clinical lab tests: ordered and reviewed           Procedures

## 2021-09-04 VITALS
HEIGHT: 70 IN | WEIGHT: 151 LBS | HEART RATE: 76 BPM | RESPIRATION RATE: 16 BRPM | TEMPERATURE: 97.9 F | SYSTOLIC BLOOD PRESSURE: 121 MMHG | BODY MASS INDEX: 21.62 KG/M2 | OXYGEN SATURATION: 100 % | DIASTOLIC BLOOD PRESSURE: 51 MMHG

## 2021-09-04 LAB
ANION GAP SERPL CALC-SCNC: 3 MMOL/L (ref 5–15)
BUN SERPL-MCNC: 12 MG/DL (ref 6–20)
BUN/CREAT SERPL: 17 (ref 12–20)
CALCIUM SERPL-MCNC: 7.6 MG/DL (ref 8.5–10.1)
CHLORIDE SERPL-SCNC: 110 MMOL/L (ref 97–108)
CO2 SERPL-SCNC: 27 MMOL/L (ref 21–32)
CREAT SERPL-MCNC: 0.7 MG/DL (ref 0.7–1.3)
ERYTHROCYTE [DISTWIDTH] IN BLOOD BY AUTOMATED COUNT: 23.2 % (ref 11.5–14.5)
GLUCOSE SERPL-MCNC: 83 MG/DL (ref 65–100)
HCT VFR BLD AUTO: 23.3 % (ref 36.6–50.3)
HCT VFR BLD AUTO: 28.7 % (ref 36.6–50.3)
HGB BLD-MCNC: 6.9 G/DL (ref 12.1–17)
HGB BLD-MCNC: 8.4 G/DL (ref 12.1–17)
HISTORY CHECKED?,CKHIST: NORMAL
MCH RBC QN AUTO: 21.8 PG (ref 26–34)
MCHC RBC AUTO-ENTMCNC: 29.6 G/DL (ref 30–36.5)
MCV RBC AUTO: 73.5 FL (ref 80–99)
NRBC # BLD: 0 K/UL (ref 0–0.01)
NRBC BLD-RTO: 0 PER 100 WBC
PLATELET # BLD AUTO: 202 K/UL (ref 150–400)
PMV BLD AUTO: 9.6 FL (ref 8.9–12.9)
POTASSIUM SERPL-SCNC: 4 MMOL/L (ref 3.5–5.1)
RBC # BLD AUTO: 3.17 M/UL (ref 4.1–5.7)
SODIUM SERPL-SCNC: 140 MMOL/L (ref 136–145)
WBC # BLD AUTO: 4.4 K/UL (ref 4.1–11.1)

## 2021-09-04 PROCEDURE — 36415 COLL VENOUS BLD VENIPUNCTURE: CPT

## 2021-09-04 PROCEDURE — 85014 HEMATOCRIT: CPT

## 2021-09-04 PROCEDURE — 80048 BASIC METABOLIC PNL TOTAL CA: CPT

## 2021-09-04 PROCEDURE — 85027 COMPLETE CBC AUTOMATED: CPT

## 2021-09-04 PROCEDURE — 36430 TRANSFUSION BLD/BLD COMPNT: CPT

## 2021-09-04 PROCEDURE — P9016 RBC LEUKOCYTES REDUCED: HCPCS

## 2021-09-04 PROCEDURE — 74011250637 HC RX REV CODE- 250/637: Performed by: HOSPITALIST

## 2021-09-04 PROCEDURE — 99218 HC RM OBSERVATION: CPT

## 2021-09-04 RX ORDER — PANTOPRAZOLE SODIUM 40 MG/1
40 TABLET, DELAYED RELEASE ORAL DAILY
Qty: 30 TABLET | Refills: 0 | Status: SHIPPED | OUTPATIENT
Start: 2021-09-04 | End: 2021-10-04

## 2021-09-04 RX ADMIN — MULTIPLE VITAMINS W/ MINERALS TAB 1 TABLET: TAB at 09:08

## 2021-09-04 RX ADMIN — Medication 10 ML: at 14:30

## 2021-09-04 NOTE — ROUTINE PROCESS
TRANSFER - OUT REPORT:    Verbal report given to Taras(name) on [place] Chinchilla  being transferred to 5E(unit) for routine progression of care       Report consisted of patients Situation, Background, Assessment and   Recommendations(SBAR). Information from the following report(s) SBAR, Kardex, ED Summary and Recent Results was reviewed with the receiving nurse. Lines:   Peripheral IV 09/03/21 Right Antecubital (Active)   Site Assessment Clean, dry, & intact 09/03/21 1552   Phlebitis Assessment 0 09/03/21 1552   Infiltration Assessment 0 09/03/21 1552   Dressing Status Clean, dry, & intact 09/03/21 1552   Dressing Type Transparent 09/03/21 1552   Hub Color/Line Status Pink 09/03/21 1552        Opportunity for questions and clarification was provided.       Patient transported with:   Registered Nurse immune

## 2021-09-04 NOTE — DISCHARGE INSTRUCTIONS
Discharge Instructions       PATIENT ID: Sathish Santos  MRN: [id number]   YOB: 1990    DATE OF ADMISSION: 9/3/2021  3:25 PM    DATE OF DISCHARGE: 9/4/2021    PRIMARY CARE PROVIDER: Soraya Burden MD     ATTENDING PHYSICIAN: Christopher Preciado MD  DISCHARGING PROVIDER: Purnima Feliciano MD    To contact this individual call [phone] and ask the  to page. If unavailable ask to be transferred the Adult Hospitalist Department. DISCHARGE DIAGNOSES Severe iron def anemia    CONSULTATIONS: IP CONSULT TO HEMATOLOGY    PROCEDURES/SURGERIES: * No surgery found *    PENDING TEST RESULTS:   At the time of discharge the following test results are still pending:     FOLLOW UP APPOINTMENTS:   Follow-up Information     Follow up With Specialties Details Why Contact Info    Milka Hinkle MD Gastroenterology In 1 week Need  [facility] [address]      Joy Govea MD Hematology and Oncology In 2 weeks Severe anemia  [address] [id number]  [phone]             ADDITIONAL CARE RECOMMENDATIONS:     DIET: Regular Diet    ACTIVITY: Activity as tolerated    WOUND CARE:     EQUIPMENT needed:       Radiology      DISCHARGE MEDICATIONS:   See Medication Reconciliation Form    · It is important that you take the medication exactly as they are prescribed. · Keep your medication in the bottles provided by the pharmacist and keep a list of the medication names, dosages, and times to be taken in your wallet. · Do not take other medications without consulting your doctor. NOTIFY YOUR PHYSICIAN FOR ANY OF THE FOLLOWING:   Fever over 101 degrees for 24 hours. Chest pain, shortness of breath, fever, chills, nausea, vomiting, diarrhea, change in mentation, falling, weakness, bleeding. Severe pain or pain not relieved by medications. Or, any other signs or symptoms that you may have questions about.       DISPOSITION:  x  Home With:   OT PT  [facility]  RN       SNF/Inpatient Rehab/LTAC    Independent/assisted living    Hospice    Other:     CDMP Checked:   Yes x     PROBLEM LIST Updated:  Yes x       Signed:   Cordell Zaman MD  9/4/2021  10:30 AM

## 2021-09-04 NOTE — DISCHARGE SUMMARY
Discharge Summary       PATIENT ID: Juan A Walsh  MRN: [id number]   YOB: 1990    DATE OF ADMISSION: 9/3/2021  3:25 PM    DATE OF DISCHARGE:  9/4/21  PRIMARY CARE PROVIDER: Soraya Burden MD     ATTENDING PHYSICIAN: Veda Ruiz  DISCHARGING PROVIDER: Claudetta Standing, MD    To contact this individual call [phone] and ask the  to page. If unavailable ask to be transferred the Adult Hospitalist Department. CONSULTATIONS: IP CONSULT TO HEMATOLOGY    PROCEDURES/SURGERIES: * No surgery found *    ADMITTING [address] [place] [address] COURSE:   Juan A Walsh is a 32 y.o. male who presents with a severe anemia. Patient has past medical history of IV drug use hep C untreated and cirrhosis of liver. Patient told me he went to a Covid clinic was tested for Covid antibody he has been vaccinated and at the ER the blood work showed he has severe anemia. He was sent to emergency room for further management found to have hemoglobin of 6.3 admitted for further management. Patient denies any chest pain shortness of breath nausea vomiting diarrhea, patient also denies any hematochezia or blood in the stool recently. Patient used to be a IV drug user and a heavy alcoholic but quit January 2021 quit IV drug use 2014. Currently patient works as an  he has several scratch marks and bite-like marks all over the body. Patient was admitted for further management. Patient takes risperidone lamotrigine and venlafaxine for psychiatric issues. He denies taking any antibiotics recently or any bleeding issues. DISCHARGE DIAGNOSES / PLAN:      1. Patient was admitted with severe anemia work-up revealed iron deficiency anemia given 1 dose of IV Venofer discharged on iron pill and PPI follow-up outpatient with hematologist and GI discussed with patient and his mom at bedside agreed to the plan.   Patient was given 2 units of blood transfusion in hospital     ADDITIONAL CARE RECOMMENDATIONS: PENDING TEST RESULTS:   At the time of discharge the following test results are still pending:     FOLLOW UP APPOINTMENTS:    Follow-up Information     Follow up With Specialties Details Why Contact Info    Shelli Gonzales MD Gastroenterology In 1 week Need  [facility]  [address] [facility] [place]      Michelle Red MD Hematology and Oncology In 2 weeks Severe anemia  [address] [facility]  [address] [address]               DIET: Regular Diet    ACTIVITY: Activity as tolerated    WOUND CARE:     EQUIPMENT needed:       DISCHARGE MEDICATIONS:  Current Discharge Medication List      START taking these medications    Details   ferrous fumarate-folic acid 880 mg (922 mg iron)-1 mg tab Take 1 Tablet by mouth two (2) times daily (after meals) for 30 days. Qty: 60 Tablet, Refills: 0  Start date: 9/4/2021, End date: 10/4/2021      pantoprazole (PROTONIX) 40 mg tablet Take 1 Tablet by mouth daily for 30 days. Qty: 30 Tablet, Refills: 0  Start date: 9/4/2021, End date: 10/4/2021         CONTINUE these medications which have NOT CHANGED    Details   risperiDONE (RisperDAL) 1 mg tablet Take 1 mg by mouth nightly. venlafaxine-SR (EFFEXOR-XR) 75 mg capsule Take 75 mg by mouth daily. lamoTRIgine (LaMICtal) 100 mg tablet Take 100 mg by mouth daily. spironolactone (ALDACTONE) 100 mg tablet Take 100 mg by mouth daily. furosemide (LASIX) 40 mg tablet Take 40 mg by mouth daily. therapeutic multivitamin (THERAGRAN) tablet Take 1 Tab by mouth daily. cetirizine (ZYRTEC) 10 mg tablet Take 10 mg by mouth daily. NOTIFY YOUR PHYSICIAN FOR ANY OF THE FOLLOWING:   Fever over 101 degrees for 24 hours. Chest pain, shortness of breath, fever, chills, nausea, vomiting, diarrhea, change in mentation, falling, weakness, bleeding. Severe pain or pain not relieved by medications.   Or, any other signs or symptoms that you may have questions about.    DISPOSITION:   x Home With:   OT  PT  HH  RN       Long term SNF/Inpatient Rehab    Independent/assisted living    Hospice    Other:       PATIENT CONDITION AT DISCHARGE:     Functional status    Poor     Deconditioned    x Independent      Cognition    x Lucid     Forgetful     Dementia      Catheters/lines (plus indication)    Bell     PICC     PEG    x None      Code status   x  Full code     DNR      PHYSICAL EXAMINATION AT DISCHARGE:  General:          Alert, cooperative, no distress, appears stated age. HEENT:           Atraumatic, anicteric sclerae, pink conjunctivae                          No oral ulcers, mucosa moist, throat clear, dentition fair  Neck:               Supple, symmetrical  Lungs:             Clear to auscultation bilaterally. No Wheezing or Rhonchi. No rales. Chest wall:      No tenderness  No Accessory muscle use. Heart:              Regular  rhythm,  No  murmur   No edema  Abdomen:        Soft, non-tender. Not distended. Bowel sounds normal  Extremities:     No cyanosis. No clubbing,                            Skin turgor normal, Capillary refill normal  Skin:                Not pale. Not Jaundiced  No rashes   Psych:             Not anxious or agitated.   Neurologic:      Alert, moves all extremities, answers questions appropriately and responds to commands       CHRONIC MEDICAL DIAGNOSES:  Problem List as of 9/4/2021 Date Reviewed: 9/3/2021        Codes Class Noted - Resolved    * (Principal) Anemia (Chronic) ICD-10-CM: D64.9  ICD-9-CM: 285.9  9/3/2021 - Present        Chronic hepatitis C without hepatic coma (RUST 75.) ICD-10-CM: B18.2  ICD-9-CM: 070.54  11/30/2020 - Present        Alcohol abuse ICD-10-CM: F10.10  ICD-9-CM: 305.00  10/23/2020 - Present        Alcoholic hepatitis LGI-88-LB: K70.10  ICD-9-CM: 571.1  10/23/2020 - Present        Tobacco use ICD-10-CM: Z72.0  ICD-9-CM: 305.1  10/23/2020 - Present        Alcoholic cirrhosis of liver with ascites (RUST 75.) ICD-10-CM: K70.31  ICD-9-CM: 571.2  10/23/2020 - Present        Panic attack ICD-10-CM: F41.0  ICD-9-CM: 300.01  10/23/2020 - Present        Anxiety and depression ICD-10-CM: F41.9, F32.9  ICD-9-CM: 300.00, 311  10/23/2020 - Present              Greater than 30  minutes were spent with the patient on counseling and coordination of care    Signed:   Leonela Benson MD  9/4/2021  11:35 AM

## 2021-09-05 LAB
ABO + RH BLD: NORMAL
BLD PROD TYP BPU: NORMAL
BLD PROD TYP BPU: NORMAL
BLOOD GROUP ANTIBODIES SERPL: NORMAL
BPU ID: NORMAL
BPU ID: NORMAL
CROSSMATCH RESULT,%XM: NORMAL
CROSSMATCH RESULT,%XM: NORMAL
SPECIMEN EXP DATE BLD: NORMAL
STATUS OF UNIT,%ST: NORMAL
STATUS OF UNIT,%ST: NORMAL
UNIT DIVISION, %UDIV: 0
UNIT DIVISION, %UDIV: 0

## 2021-11-04 ENCOUNTER — OFFICE VISIT (OUTPATIENT)
Dept: HEMATOLOGY | Age: 31
End: 2021-11-04
Payer: COMMERCIAL

## 2021-11-04 VITALS
WEIGHT: 162 LBS | RESPIRATION RATE: 14 BRPM | HEIGHT: 70 IN | OXYGEN SATURATION: 98 % | SYSTOLIC BLOOD PRESSURE: 120 MMHG | HEART RATE: 76 BPM | BODY MASS INDEX: 23.19 KG/M2 | TEMPERATURE: 98.8 F | DIASTOLIC BLOOD PRESSURE: 65 MMHG

## 2021-11-04 DIAGNOSIS — B18.2 CHRONIC HEPATITIS C WITHOUT HEPATIC COMA (HCC): Primary | ICD-10-CM

## 2021-11-04 PROCEDURE — 99215 OFFICE O/P EST HI 40 MIN: CPT | Performed by: NURSE PRACTITIONER

## 2021-11-04 RX ORDER — FUROSEMIDE 40 MG/1
40 TABLET ORAL DAILY
Qty: 90 TABLET | Refills: 3 | Status: SHIPPED | OUTPATIENT
Start: 2021-11-04 | End: 2022-02-08

## 2021-11-04 RX ORDER — PANTOPRAZOLE SODIUM 40 MG/1
TABLET, DELAYED RELEASE ORAL
COMMUNITY
Start: 2021-10-08

## 2021-11-04 RX ORDER — NADOLOL 40 MG/1
TABLET ORAL
COMMUNITY
Start: 2021-10-08

## 2021-11-04 RX ORDER — SPIRONOLACTONE 100 MG/1
100 TABLET, FILM COATED ORAL DAILY
Qty: 90 TABLET | Refills: 3 | Status: SHIPPED | OUTPATIENT
Start: 2021-11-04 | End: 2022-02-08

## 2021-11-04 NOTE — PROGRESS NOTES
Chief Complaint   Patient presents with    Hepatitis C     f/u    Cirrhosis Of Liver     f/u     1. Have you been to the ER, urgent care clinic since your last visit? Hospitalized since your last visit? Yes Where: [facility] anemia early september    2. Have you seen or consulted any other health care providers outside of the [address] [place] [address] since your last visit? Include any pap smears or colon screening.  No     Visit Vitals  /65 (BP 1 Location: Left arm, BP Patient Position: Sitting, BP Cuff Size: Adult)   Pulse 76   Temp 98.8 °F (37.1 °C) (Temporal)   Resp 14   Ht 5' 10\" (1.778 m)   Wt 162 lb (73.5 kg)   SpO2 98%   BMI 23.24 kg/m²

## 2021-11-05 ENCOUNTER — TELEPHONE (OUTPATIENT)
Dept: HEMATOLOGY | Age: 31
End: 2021-11-05

## 2021-11-05 LAB
ALBUMIN SERPL-MCNC: 4 G/DL (ref 4–5)
ALP SERPL-CCNC: 138 IU/L (ref 44–121)
ALT SERPL-CCNC: 25 IU/L (ref 0–44)
AST SERPL-CCNC: 42 IU/L (ref 0–40)
BILIRUB DIRECT SERPL-MCNC: 0.22 MG/DL (ref 0–0.4)
BILIRUB SERPL-MCNC: 0.5 MG/DL (ref 0–1.2)
BUN SERPL-MCNC: 11 MG/DL (ref 6–20)
BUN/CREAT SERPL: 13 (ref 9–20)
CALCIUM SERPL-MCNC: 9.5 MG/DL (ref 8.7–10.2)
CHLORIDE SERPL-SCNC: 104 MMOL/L (ref 96–106)
CO2 SERPL-SCNC: 25 MMOL/L (ref 20–29)
CREAT SERPL-MCNC: 0.88 MG/DL (ref 0.76–1.27)
ERYTHROCYTE [DISTWIDTH] IN BLOOD BY AUTOMATED COUNT: 22 % (ref 11.6–15.4)
GLUCOSE SERPL-MCNC: 73 MG/DL (ref 65–99)
HAV AB SER QL IA: NEGATIVE
HCT VFR BLD AUTO: 31.8 % (ref 37.5–51)
HCV RNA SERPL QL NAA+PROBE: NEGATIVE
HGB BLD-MCNC: 10.2 G/DL (ref 13–17.7)
INR PPP: 1.1 (ref 0.9–1.2)
MCH RBC QN AUTO: 25.2 PG (ref 26.6–33)
MCHC RBC AUTO-ENTMCNC: 32.1 G/DL (ref 31.5–35.7)
MCV RBC AUTO: 79 FL (ref 79–97)
PLATELET # BLD AUTO: 332 X10E3/UL (ref 150–450)
POTASSIUM SERPL-SCNC: 4.3 MMOL/L (ref 3.5–5.2)
PROT SERPL-MCNC: 6.3 G/DL (ref 6–8.5)
PROTHROMBIN TIME: 11.9 SEC (ref 9.1–12)
RBC # BLD AUTO: 4.05 X10E6/UL (ref 4.14–5.8)
SODIUM SERPL-SCNC: 140 MMOL/L (ref 134–144)
WBC # BLD AUTO: 5.5 X10E3/UL (ref 3.4–10.8)

## 2021-11-05 NOTE — PROGRESS NOTES
Oliverio Escobedo MD, Jamie Carrillo, Champ Dumont, Leon Shone, MD, MPH      Chauncey Johnson, PA-FATMATA Rider, ACNP-BC     April S Juan David, [facility]   Chuckyelida Silvano, FNP-C    Shelia Mccollum, [facility]       Robe Regalado [facility] [address] [facility] [address]    at [address], [address] [place]  [address].    [phone]    FAX: [address]    [facility]    [address], [address]    [facility], [address]    [phone]    FAX: [phone]     Patient Care Team:  Other, Good Levy MD as PCP - General    Problem List  Date Reviewed: 9/3/2021          Codes Class Noted    Anemia (Chronic) ICD-10-CM: D64.9  ICD-9-CM: 285.9  9/3/2021        Chronic hepatitis C without hepatic coma ([facility] ICD-10-CM: B18.2  ICD-9-CM: 070.54  11/30/2020        Alcohol abuse ICD-10-CM: F10.10  ICD-9-CM: 305.00  53/22/0580        Alcoholic hepatitis OAG-08-ZB: K70.10  ICD-9-CM: 571.1  10/23/2020        Tobacco use ICD-10-CM: Z72.0  ICD-9-CM: 305.1  71/80/1803        Alcoholic cirrhosis of liver with ascites ([facility].) ICD-10-CM: K70.31  ICD-9-CM: 571.2  10/23/2020        Panic attack ICD-10-CM: F41.0  ICD-9-CM: 300.01  10/23/2020        Anxiety and depression ICD-10-CM: F41.9, F32. A  ICD-9-CM: 300.00, 311  10/23/2020            *Addended to add [facility] anemia records*    Arnell Ormond  is being seen at [facility] for management of cirrhosis secondary to chronic HCV and alcoholic liver disease. The active problem list, all pertinent past medical history, medications, endoscopic studies, radiologic findings and laboratory findings related to the liver disorder were reviewed and discussed with the patient. The patient is a 32 y.o.   male who was found to have elevated liver enzymes in 10/2020 during hospital admission for alcohol hepatitis. Serologic evaluation for markers of chronic liver disease performed in the clinic was positive for chronic HCV. An assessment of liver fibrosis with biopsy or elastography was not performed. He was found to be profoundly anemic recently after lab work at [facility]. He denies any signs or symptoms of anemia at that time. He was admitted in 9/2021 for management. He has been given IV and PO iron and was repleted with 2 units of PRBCs while admitted. He states Efrem Nick did an EGD but it is not in our system. Per the patient, he did not find a cause for the anemia. He is followed by hematology for this. He remains on step 1 diuretics and will have edema if this is missed. Denies ascites, HE, jaundice, melena. Patient is working full time as an  and has no issues with his ADLs. ASSESSMENT AND PLAN:  Cirrhosis  Due to chronic HCV and extensive alcohol abuse. MELD: 9    CPS: 6    Class: A    Severe alcoholic hepatitis  He was treated with prednisone during hospitalization in 10/2020. He has had some liver recovery. He has been abstinent of alcohol since 1/2021. Chronic hepatitis C  Will update labs for HCV treatment. Will perform FibroScan at the next visit before ordering. I would like to see fibrosis score and new imaging prior to ordering medication. I have reviewed the process and rationale for this with the patient. Ascites   This appears to be resolved. He will continue on step 1 at this time. Lower extremity edema  He is on step 1 diuretics. This controls his edema. If he does not take them, he develops edema mostly in his lower legs versus feet. His albumin is low and this is likely contributing to his persistent edema. He is working on increasing his protein intake. I have encouraged him to supplement with protein drinks between meals. He maintains a low sodium diet. Iron deficient anemia  [facility] is managing this.  I will not duplicate their efforts with checking ferritin and iron today. He sees them next in December 2021. I will request their records. Screening for esophageal varices  10/2021. EGD with Dr. Robin Durham. Varices in lower third of esophagus. Erythema consistent with gastritis. Placed on PPI and Nadalol. Screening for hepatocellular carcinoma  10/2021. MRI with no lesion. No AFP has been performed. Ultrasound was ordered today. I will check AFP at the next visit. Treatment of other medical problems in patients with chronic liver disease  There are no contraindications for the patient to take most medications necessary for treatment of other medical issues. The patient has cirrhosis and should avoid NSAIDs which are associated with a higher rate of developing BHUPINDER. The patient can take any medications utilized for treatment of DM and/or statins to treat hypercholesterolemia. The patient has been abstinent of alcohol for over 6 months. He can safely take acetaminophen as needed for pain. Even patients with cirrhosis can safely take acetaminophen as needed. Counseling for alcohol in patients with chronic liver disease  Patient has been abstinent from all alcoholic beverages since 4/45/5780. Vaccinations   Recommend vaccination against hepatitis A. He has documented immunity to viral hepatitis B. Routine vaccinations against other bacterial and viral agents can be performed as indicated. Annual flu vaccination should be administered if indicated. ALLERGIES  Allergies   Allergen Reactions    Opioids - Morphine Analogues Other (comments)     Pt. States Former Heroin addict; no opioids    Other Medication Other (comments)     opiods     MEDICATIONS  Current Outpatient Medications   Medication Sig    nadoloL (CORGARD) 40 mg tablet     pantoprazole (PROTONIX) 40 mg tablet     spironolactone (ALDACTONE) 100 mg tablet Take 1 Tablet by mouth daily.     furosemide (LASIX) 40 mg tablet Take 1 Tablet by mouth daily.  risperiDONE (RisperDAL) 1 mg tablet Take 1 mg by mouth nightly.  venlafaxine-SR (EFFEXOR-XR) 75 mg capsule Take 75 mg by mouth daily.  lamoTRIgine (LaMICtal) 100 mg tablet Take 100 mg by mouth daily.  cetirizine (ZYRTEC) 10 mg tablet Take 10 mg by mouth daily. (Patient not taking: Reported on 9/3/2021)    therapeutic multivitamin [facility]) tablet Take 1 Tab by mouth daily. (Patient not taking: Reported on 11/4/2021)     No current facility-administered medications for this visit. FAMILY HISTORY:  The patient has no knowledge of the father's medical condition. The mother has the following chronic disease(s): migraines, left eye blindness, HTN  There is no family history of liver disease. There is no family history of immune disorders. SOCIAL HISTORY:  The patient is single. The patient has no children. The patient smokes 1 pack of cigarettes per day down to 3 cigarettes per day. 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 Last use 10/10/2014. The patient had previously consumed alcohol in excess. He drank 10-12 drinks of liquor daily. Last drink 1/17/2021. The patient works full time as an . PHYSICAL EXAMINATION:  Visit Vitals  /65 (BP 1 Location: Left arm, BP Patient Position: Sitting, BP Cuff Size: Adult)   Pulse 76   Temp 98.8 °F (37.1 °C) (Temporal)   Resp 14   Ht 5' 10\" (1.778 m)   Wt 162 lb (73.5 kg)   SpO2 98%   BMI 23.24 kg/m²     General: No acute distress. Eyes: Sclera anicteric. ENT: No oral lesions. Nodes: No adenopathy. Skin: No spider angiomata. No jaundice. No palmar erythema. Respiratory: Lungs clear to auscultation. Cardiovascular: Regular heart rate. No murmurs. No JVD. Abdomen: Soft non-tender, liver size normal to percussion/palpation. Spleen not palpable. No obvious ascites. Extremities: No edema. No muscle wasting. No gross arthritic changes. Neurologic: Alert and oriented. Cranial nerves grossly intact. No asterixis.     LABORATORY STUDIES:  Liver [place] of [address] [place] [address] Units 9/3/2021 11/30/2020   WBC 3.4 - 10.8 x10E3/uL 5.1 4.4   ANC 1.8 - 8.0 K/UL 2.9 2.0   HGB 13.0 - 17.7 g/dL 6.3 (L) 10.8 (L)    - 450 x10E3/uL 214 145 (L)   INR 0.9 - 1.2 1.3 (H) 1.5 (H)   AST 15 - 37 U/L 64 (H) 105 (H)   ALT 12 - 78 U/L 43 61   Alk Phos 45 - 117 U/L 122 (H) 137 (H)   Bili, Total 0.2 - 1.0 MG/DL 0.8 4.9 (H)   Albumin 3.5 - 5.0 g/dL 2.8 (L) 2.6 (L)   BUN 6 - 20 mg/dL 12 8   Creat 0.76 - 1.27 mg/dL 0.78 0.79   Na 134 - 144 mmol/L 137 133 (L)   K 3.5 - 5.2 mmol/L 4.1 3.9   Cl 96 - 106 mmol/L 103 100   CO2 20 - 29 mmol/L 28 29   Glucose 65 - 99 mg/dL 102 (H) 102 (H)   Magnesium 1.6 - 2.4 mg/dL 2.0      Liver [place] of [address] [place] [address] Ref Rng & Units 10/30/2020   WBC 3.4 - 10.8 x10E3/uL 18.0 (H)   ANC 1.8 - 8.0 K/UL 13.6 (H)   HGB 13.0 - 17.7 g/dL 10.6 (L)    - 450 x10E3/uL 187   INR 0.9 - 1.2 1.5 (H)   AST 15 - 37 U/L 141 (H)   ALT 12 - 78 U/L 133 (H)   Alk Phos 45 - 117 U/L 129 (H)   Bili, Total 0.2 - 1.0 MG/DL 3.5 (H)   Albumin 3.5 - 5.0 g/dL 2.5 (L)   BUN 6 - 20 mg/dL 16   Creat 0.76 - 1.27 mg/dL 0.73   Na 134 - 144 mmol/L 137   K 3.5 - 5.2 mmol/L 4.1   Cl 96 - 106 mmol/L 101   CO2 20 - 29 mmol/L 29   Glucose 65 - 99 mg/dL 87   Magnesium 1.6 - 2.4 mg/dL      SEROLOGIES:  Serologies Latest Ref Rng & Units 9/3/2021 11/30/2020   Hep A Ab, Total Negative     Hep B Surface Ag Index     Hep B Surface Ag Interp Negative       Hep B Core Ab, Total Negative       Hep B Surface Ab mIU/mL     Hep B Surface Ab Interp NONREACTIVE       Hep C Ab 0.0 - 0.9 s/co ratio     Hep C Genotype   1a   HCV RT-PCR, Quant IU/mL  15,900   Ferritin 26 - 388 NG/ML 6 (L)    Iron % Saturation 20 - 50 % 3 (L)      Serologies Latest Ref Rng & Units 10/30/2020   Hep A Ab, Total Negative Negative   Hep B Surface Ag Index <0.10   Hep B Surface Ag Interp Negative   Negative   Hep B Core Ab, Total Negative   Negative   Hep B Surface Ab mIU/mL 10.51   Hep B Surface Ab Interp NONREACTIVE   REACTIVE (A)   Hep C Ab 0.0 - 0.9 s/co ratio >11.0 (H)   Hep C Genotype     HCV RT-PCR, Quant IU/mL    Ferritin 26 - 388 NG/ML    Iron % Saturation 20 - 50 %      LIVER HISTOLOGY:  Not available or performed    ENDOSCOPIC PROCEDURES:  10/2021. EGD by Tiki Talley. 2 cords of grade II varices in lower third of esophagus. RADIOLOGY:  10/10/2020. MRI with and without contrast of liver (for evaluation of possible PVT). Cirrhosis with hepatic steatosis and portal hypertensive changes without portal vein thrombosis. Small right pleural effusion, small ascites. 10/8/2020. Abdominal ultrasound. Cirrhosis with moderate hepatic steatosis. Splenomegaly. OTHER TESTING:  Not available or performed    FOLLOW-UP:  All of the issues listed above in the assessment and plan were discussed with the patient. All questions were answered. The patient expressed a clear understanding of the above. [facility] [place] [address] in 3 months for FibroScan and to discuss and prescribe HCV treatment.     MECHELLE Bosch-BC  Liver [facility] [address] [facility], [address] [place]  22.  [phone]

## 2021-11-05 NOTE — TELEPHONE ENCOUNTER
----- Message from [address]. Darius Bowen NP sent at 11/5/2021  8:20 AM EDT -----  Regarding: records  Can you please obtain records from [address] [place], [place] [address]e for anemia and [facility] Marlen Se). This has been a new development since we had seen him last in the office before yesterday. Thanks. [email or web address] office () to obtain medical record and called I Dr. Ophelia Landrum to obtain medical records also. (KF)    [email or web address] Rec'd medical records from Dr. Jeffy Perkins office. Waiting on [facility]. (KF)    [email or web address] Rec'd medical records from [address] [place], [place] [address]e.  (RACHEL)

## 2021-11-06 LAB
HCV GENTYP SERPL NAA+PROBE: NORMAL
PLEASE NOTE, 550474: NORMAL

## 2021-11-08 NOTE — PROGRESS NOTES
Seferino, your liver enzymes and function continue to improve off of alcohol. Your hepatitis C is undetected. I will confirm this at the next visit to ensure first one is not a false negative. If that again is negative, you have spontaneously cleared the virus and do not need treatment. I will also rule out other causes of liver disease since the enzymes are still elevated. These are very good findings. This is all a testament to your hard work in eliminating alcohol and staying sober. Reach out with anything you need prior to our next appt.      Nonnie Pencil

## 2021-11-11 NOTE — TELEPHONE ENCOUNTER
----- Message from [address]. Lore Pineda NP sent at 11/5/2021  8:20 AM EDT -----  Regarding: records  Can you please obtain records from [facility] for anemia and ADOLFO Le). This has been a new development since we had seen him last in the office before yesterday. Thanks.

## 2021-11-17 ENCOUNTER — TRANSCRIBE ORDER (OUTPATIENT)
Dept: SCHEDULING | Age: 31
End: 2021-11-17

## 2021-11-17 DIAGNOSIS — R19.09 GROIN MASS: Primary | ICD-10-CM

## 2021-11-18 ENCOUNTER — TRANSCRIBE ORDER (OUTPATIENT)
Dept: SCHEDULING | Age: 31
End: 2021-11-18

## 2021-11-18 DIAGNOSIS — R19.09 GROIN MASS: Primary | ICD-10-CM

## 2021-12-07 ENCOUNTER — OFFICE VISIT (OUTPATIENT)
Dept: SURGERY | Age: 31
End: 2021-12-07
Payer: COMMERCIAL

## 2021-12-07 VITALS
BODY MASS INDEX: 23.91 KG/M2 | HEIGHT: 70 IN | DIASTOLIC BLOOD PRESSURE: 71 MMHG | SYSTOLIC BLOOD PRESSURE: 115 MMHG | OXYGEN SATURATION: 98 % | WEIGHT: 167 LBS | HEART RATE: 66 BPM

## 2021-12-07 DIAGNOSIS — K40.90 NON-RECURRENT UNILATERAL INGUINAL HERNIA WITHOUT OBSTRUCTION OR GANGRENE: Primary | ICD-10-CM

## 2021-12-07 PROCEDURE — 99204 OFFICE O/P NEW MOD 45 MIN: CPT | Performed by: SURGERY

## 2021-12-07 NOTE — PROGRESS NOTES
1. Have you been to the ER, urgent care clinic since your last visit? Hospitalized since your last visit? No    2. Have you seen or consulted any other health care providers outside of the [address] [place] [address] since your last visit? Include any pap smears or colon screening.  No

## 2021-12-07 NOTE — PROGRESS NOTES
General Surgery Office Consultation / H & P    CC: Right groin pain  History of Present Illness:      Sudhir Young is a 32 y.o. male who presents with right groin pain. Patient reports that about a year ago while having a lot of emesis he started developing a pain in his right groin. Over the last year working as  the pain is gotten worse. He has to leave work early sometimes because the pain is too bad. The pain is sharp in nature and 9 out of 10. Some radiation to his leg and testicle. No nausea or vomiting. Heavy lifting activity worsen the pain. Relaxation helps. No other history of hernias or hernia repair. No prior abdominal surgery. History of an opioid addiction and is very concerned about opioids postoperatively. Past Medical History:   Diagnosis Date    IVDU (intravenous drug user)      No past surgical history on file. No family history on file. Social History     Socioeconomic History    Marital status: SINGLE   Tobacco Use    Smoking status: Current Every Day Smoker     Packs/day: 0.10    Smokeless tobacco: Former User   Substance and Sexual Activity    Alcohol use: Not Currently     Comment: 14-18 drinks/week    Drug use: Yes     Types: Marijuana     Comment: no longer using heroin; 6 years off heroin    Sexual activity: Yes     Partners: Female, Male     Birth control/protection: Condom      Prior to Admission medications    Medication Sig Start Date End Date Taking? Authorizing Provider   nadoloL (CORGARD) 40 mg tablet  10/8/21  Yes Provider, Historical   pantoprazole (PROTONIX) 40 mg tablet  10/8/21  Yes Provider, Historical   spironolactone (ALDACTONE) 100 mg tablet Take 1 Tablet by mouth daily. 11/4/21  Yes Clari Nicholas, NP   furosemide (LASIX) 40 mg tablet Take 1 Tablet by mouth daily. 11/4/21  Yes Clari Nicholas NP   risperiDONE (RisperDAL) 1 mg tablet Take 1 mg by mouth nightly.  8/6/21  Yes Other, MD Soraya   venlafaxine-SR [facility] 75 mg capsule Take 75 mg by mouth daily. 7/27/21  Yes Other, MD Soraya   lamoTRIgine (LaMICtal) 100 mg tablet Take 100 mg by mouth daily. 8/6/21  Yes Other, MD Soraya     Allergies   Allergen Reactions    Opioids - Morphine Analogues Other (comments)     Pt. States Former Heroin addict; no opioids    Other Medication Other (comments)     opiods       Review of Systems:  Constitutional: No fever or chills  Neurologic: No headache  Eyes: No scleral icterus or irritated eyes  Nose: No nasal pain or drainage  Mouth: No oral lesions or sore throat  Cardiac: No palpations or chest pain  Pulmonary: No cough or shortness of breath  Gastrointestinal: No nausea, emesis, diarrhea, or constipation  Genitourinary: No dysuria  Musculoskeletal: Right groin bulge and pain  Skin: No rashes or lesions  Psychiatric: No anxiety or depressed mood    Physical Exam:     Visit Vitals  /71 (BP 1 Location: Left upper arm, BP Patient Position: Sitting, BP Cuff Size: Adult)   Pulse 66   Ht 5' 10\" (1.778 m)   Wt 167 lb (75.8 kg)   SpO2 98%   BMI 23.96 kg/m²     General: No acute distress, conversant  Eyes: PERRLA, no scleral icterus  HENT: Normocephalic without oral lesions  Neck: Trachea midline without LAD  Cardiac: Normal pulse rate and rhythm  Pulmonary: Symmetric chest rise with normal effort  GI: Soft, NT, ND, inducible right direct inguinal hernia, no left inguinal hernia, no splenomegaly  Skin: Warm without rash  Extremities: No edema or joint stiffness  Psych: Appropriate mood and affect    Assessment:     43-year-old male with a history of an opioid addiction with a symptomatic right inguinal hernia    Plan:     Patient with symptomatic right inguinal hernia. Recommend robotic repair with mesh.   We discussed the risks and benefits of surgery which include bleeding, infection, bowel injury, vascular injury, testicular injury including the vas deferens, testicular loss, chronic groin pain, mesh infection, and the risk of anesthesia which include MI, stroke, death. Patient understands these risks and wants to proceed as soon as able given his pain.     Signed By: Eugenia Fernandez MD  Bariatric and General Surgeon  [facility]    December 7, 2021

## 2021-12-13 ENCOUNTER — DOCUMENTATION ONLY (OUTPATIENT)
Dept: HEMATOLOGY | Age: 31
End: 2021-12-13

## 2022-02-08 ENCOUNTER — TELEPHONE (OUTPATIENT)
Dept: HEMATOLOGY | Age: 32
End: 2022-02-08

## 2022-02-08 ENCOUNTER — OFFICE VISIT (OUTPATIENT)
Dept: HEMATOLOGY | Age: 32
End: 2022-02-08
Payer: COMMERCIAL

## 2022-02-08 VITALS
HEART RATE: 85 BPM | BODY MASS INDEX: 22.33 KG/M2 | WEIGHT: 156 LBS | DIASTOLIC BLOOD PRESSURE: 54 MMHG | TEMPERATURE: 96.4 F | HEIGHT: 70 IN | SYSTOLIC BLOOD PRESSURE: 121 MMHG

## 2022-02-08 DIAGNOSIS — K74.60 CIRRHOSIS OF LIVER WITHOUT ASCITES, UNSPECIFIED HEPATIC CIRRHOSIS TYPE (HCC): Primary | ICD-10-CM

## 2022-02-08 DIAGNOSIS — B18.2 CHRONIC HEPATITIS C WITHOUT HEPATIC COMA (HCC): ICD-10-CM

## 2022-02-08 PROCEDURE — 99214 OFFICE O/P EST MOD 30 MIN: CPT | Performed by: NURSE PRACTITIONER

## 2022-02-08 RX ORDER — ONDANSETRON 4 MG/1
TABLET, ORALLY DISINTEGRATING ORAL
COMMUNITY
Start: 2022-01-18 | End: 2022-02-08

## 2022-02-08 RX ORDER — OXYCODONE HYDROCHLORIDE 5 MG/1
TABLET ORAL
COMMUNITY
Start: 2022-01-18 | End: 2022-02-08

## 2022-02-08 NOTE — TELEPHONE ENCOUNTER
LVM to let patient know that we will do a Fibrosure test instead of a Fibroscan per Chadwick Hinds NP

## 2022-02-08 NOTE — PROGRESS NOTES
Patrica Desai MD, [address], [place], [place]      Evelin Barone, AGNIESZKA Sagastume, [facility]     Coreen Fall, [facility]   Giuliana Colunga, FNP-FATMATA Garces, [facility]       Robe Regalado [facility] 136    at [address]    [address] [place] [address], [facility] Yemi Lomas  22.    [phone]    FAX: [address] [place] [address], [address]    [phone]    FAX: [phone]     Patient Care Team:  Other, Yumiko García MD as PCP - Nakul Mccormack MD (Gastroenterology)    Problem List  Date Reviewed: 12/7/2021          Codes Class Noted    Anemia (Chronic) ICD-10-CM: D64.9  ICD-9-CM: 285.9  9/3/2021        Chronic hepatitis C without hepatic coma ([facility].) ICD-10-CM: B18.2  ICD-9-CM: 070.54  11/30/2020        Alcohol abuse ICD-10-CM: F10.10  ICD-9-CM: 305.00  06/14/7639        Alcoholic hepatitis MVK-30-TM: K70.10  ICD-9-CM: 571.1  10/23/2020        Tobacco use ICD-10-CM: Z72.0  ICD-9-CM: 305.1  53/01/2914        Alcoholic cirrhosis of liver with ascites ([facility].) ICD-10-CM: K70.31  ICD-9-CM: 571.2  10/23/2020        Panic attack ICD-10-CM: F41.0  ICD-9-CM: 300.01  10/23/2020        Anxiety and depression ICD-10-CM: F41.9, F32. A  ICD-9-CM: 300.00, 311  10/23/2020            Etta Noble is being seen at [facility] for management of cirrhosis secondary to chronic HCV and alcoholic liver disease. The active problem list, all pertinent past medical history, medications, endoscopic studies, radiologic findings and laboratory findings related to the liver disorder were reviewed and discussed with the patient. The patient is a 32 y.o.  male who was found to have elevated liver enzymes in 10/2020 during hospital admission for alcohol hepatitis.     An assessment of liver fibrosis with elastography was going to be performed during this office visit but his insurance does not cover the procedure. He had surgical repair of his hernia and is doing well. Patient is working full time as an  and has no issues with his ADLs. He currently has not been working the last month but before that he was. ASSESSMENT AND PLAN:  Cirrhosis  Due to chronic HCV and extensive alcohol abuse. MELD: 9    CPS: 6    Class: A    Severe alcoholic hepatitis  He was treated with prednisone during hospitalization in 10/2020. He has had some liver recovery. He has been abstinent of alcohol since 1/2021. He is doing great with this. Chronic hepatitis C  He was negative for active virus at last visit indicating he may have spontaneously cleared the virus. I will repeat it today. If it is again negative, I will send him a letter detailing the labs for future use. Ascites   Resolved. No longer uses any diuretics. Lower extremity edema  Resolved. Off of diuretics. Iron deficient anemia  [facility] is managing this. Per patient, no etiology has been found. Screening for esophageal varices  10/2021. EGD with Dr. Kathryn Guan. Varices in lower third of esophagus. Erythema consistent with gastritis. Placed on PPI and Nadalol. Screening for hepatocellular carcinoma  10/2021. MRI with no lesion. No AFP has been performed. Ultrasound and AFP ordered today. Treatment of other medical problems in patients with chronic liver disease  There are no contraindications for the patient to take most medications necessary for treatment of other medical issues. The patient has cirrhosis and should avoid NSAIDs which are associated with a higher rate of developing BHUPINDER. The patient can take any medications utilized for treatment of DM and/or statins to treat hypercholesterolemia. The patient has been abstinent of alcohol for over 6 months.  He can safely take acetaminophen as needed for pain. Even patients with cirrhosis can safely take acetaminophen as needed. Counseling for alcohol in patients with chronic liver disease  Patient has been abstinent from all alcoholic beverages since 9/67/1780. Vaccinations   Recommend vaccination against hepatitis A. He has documented immunity to viral hepatitis B. Routine vaccinations against other bacterial and viral agents can be performed as indicated. Annual flu vaccination should be administered if indicated. ALLERGIES  Allergies   Allergen Reactions    Opioids - Morphine Analogues Other (comments)     Pt. States Former Heroin addict; no opioids    Other Medication Other (comments)     opiods     MEDICATIONS  Current Outpatient Medications   Medication Sig    nadoloL (CORGARD) 40 mg tablet     pantoprazole (PROTONIX) 40 mg tablet     risperiDONE (RisperDAL) 1 mg tablet Take 1 mg by mouth nightly.  venlafaxine-SR (EFFEXOR-XR) 75 mg capsule Take 75 mg by mouth daily.  lamoTRIgine (LaMICtal) 100 mg tablet Take 100 mg by mouth daily. No current facility-administered medications for this visit. FAMILY HISTORY:  The patient has no knowledge of the father's medical condition. The mother has the following chronic disease(s): migraines, left eye blindness, HTN  There is no family history of liver disease. There is no family history of immune disorders. SOCIAL HISTORY:  The patient is single. The patient has no children. The patient smokes 1 pack of cigarettes per day down to 3 cigarettes per day. 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 Last use 10/10/2014. The patient had previously consumed alcohol in excess. He drank 10-12 drinks of liquor daily. Last drink 1/17/2021. The patient works full time as an .      PHYSICAL EXAMINATION:  Visit Vitals  BP (!) 121/54 (BP 1 Location: Right upper arm, BP Patient Position: Sitting, BP Cuff Size: Adult)   Pulse 85   Temp (!) 96.4 °F (35.8 °C) (Temporal)   Ht 5' 10\" (1.778 m)   Wt 156 lb (70.8 kg)   BMI 22.38 kg/m²     General: No acute distress. Eyes: Sclera anicteric. ENT: No oral lesions. Nodes: No adenopathy. Skin: No spider angiomata. No jaundice. No palmar erythema. Respiratory: Lungs clear to auscultation. Cardiovascular: Regular heart rate. No murmurs. No JVD. Abdomen: Soft non-tender, liver size normal to percussion/palpation. Spleen not palpable. No obvious ascites. Extremities: No edema. No muscle wasting. No gross arthritic changes. Neurologic: Alert and oriented. Cranial nerves grossly intact. No asterixis.     LABORATORY STUDIES:  [address] Units 11/4/2021 9/4/2021 9/4/2021   WBC 3.4 - 10.8 x10E3/uL 5.5  4.4   ANC 1.8 - 8.0 K/UL      HGB 13.0 - 17.7 g/dL 10.2 (L) 8.4 (L) 6.9 (L)    - 450 x10E3/uL 332  202   INR 0.9 - 1.2 1.1     AST 0 - 40 IU/L 42 (H)     ALT 0 - 44 IU/L 25     Alk Phos 44 - 121 IU/L 138 (H)     Bili, Total 0.0 - 1.2 mg/dL 0.5     Bili, Direct 0.00 - 0.40 mg/dL 0.22     Albumin 4.0 - 5.0 g/dL 4.0     BUN 6 - 20 mg/dL 11  12   Creat 0.76 - 1.27 mg/dL 0.88  0.70   Na 134 - 144 mmol/L 140  140   K 3.5 - 5.2 mmol/L 4.3  4.0   Cl 96 - 106 mmol/L 104  110 (H)   CO2 20 - 29 mmol/L 25  27   Glucose 65 - 99 mg/dL 73  83   Magnesium 1.6 - 2.4 mg/dL        Liver [place] [facility] Latest Ref Rng & Units 9/3/2021 11/30/2020   WBC 3.4 - 10.8 x10E3/uL 5.1 4.4   ANC 1.8 - 8.0 K/UL 2.9 2.0   HGB 13.0 - 17.7 g/dL 6.3 (L) 10.8 (L)    - 450 x10E3/uL 214 145 (L)   INR 0.9 - 1.2 1.3 (H) 1.5 (H)   AST 0 - 40 IU/L 64 (H) 105 (H)   ALT 0 - 44 IU/L 43 61   Alk Phos 44 - 121 IU/L 122 (H) 137 (H)   Bili, Total 0.0 - 1.2 mg/dL 0.8 4.9 (H)   Bili, Direct 0.00 - 0.40 mg/dL     Albumin 4.0 - 5.0 g/dL 2.8 (L) 2.6 (L)   BUN 6 - 20 mg/dL 12 8   Creat 0.76 - 1.27 mg/dL 0.78 0.79   Na 134 - 144 mmol/L 137 133 (L)   K 3.5 - 5.2 mmol/L 4.1 3.9   Cl 96 - 106 mmol/L 103 100   CO2 20 - 29 mmol/L 28 29   Glucose 65 - 99 mg/dL 102 (H) 102 (H)   Magnesium 1.6 - 2.4 mg/dL 2.0      SEROLOGIES:  Serologies Latest Ref Rng & Units 9/3/2021 11/30/2020   Hep A Ab, Total Negative     Hep B Surface Ag Index     Hep B Surface Ag Interp Negative       Hep B Core Ab, Total Negative       Hep B Surface Ab mIU/mL     Hep B Surface Ab Interp NONREACTIVE       Hep C Ab 0.0 - 0.9 s/co ratio     Hep C Genotype   1a   HCV RT-PCR, Quant IU/mL  15,900   Ferritin 26 - 388 NG/ML 6 (L)    Iron % Saturation 20 - 50 % 3 (L)      Serologies Latest Ref Rng & Units 10/30/2020   Hep A Ab, Total Negative Negative   Hep B Surface Ag Index <0.10   Hep B Surface Ag Interp Negative   Negative   Hep B Core Ab, Total Negative   Negative   Hep B Surface Ab mIU/mL 10.51   Hep B Surface Ab Interp NONREACTIVE   REACTIVE (A)   Hep C Ab 0.0 - 0.9 s/co ratio >11.0 (H)   Hep C Genotype     HCV RT-PCR, Quant IU/mL    Ferritin 26 - 388 NG/ML    Iron % Saturation 20 - 50 %      LIVER HISTOLOGY:  Not available or performed    ENDOSCOPIC PROCEDURES:  10/2021. EGD by Fam Ferrari. 2 cords of grade II varices in lower third of esophagus. RADIOLOGY:  10/10/2020. MRI with and without contrast of liver (for evaluation of possible PVT). Cirrhosis with hepatic steatosis and portal hypertensive changes without portal vein thrombosis. Small right pleural effusion, small ascites. 10/8/2020. Abdominal ultrasound. Cirrhosis with moderate hepatic steatosis. Splenomegaly. OTHER TESTING:  Not available or performed    FOLLOW-UP:  All of the issues listed above in the assessment and plan were discussed with the patient. All questions were answered. The patient expressed a clear understanding of the above. [address] in 6 months. He is doing well and we just need time for his liver to recover.      Soren Berger, [facility] Center[facility]-BC  Liver [place] [facility] 59 [address] [facility] [place], [address] Yemi Vaca  22.  [phone]

## 2022-02-08 NOTE — PROGRESS NOTES
Identified pt with two pt identifiers(name and ). Reviewed record in preparation for visit and have obtained necessary documentation. Chief Complaint   Patient presents with    Cirrhosis Of Liver     f/u      Vitals:    22 1551   BP: (!) 121/54   Pulse: 85   Temp: (!) 96.4 °F (35.8 °C)   TempSrc: Temporal   Weight: 156 lb (70.8 kg)   Height: 5' 10\" (1.778 m)   PainSc:   0 - No pain       Health Maintenance Review: Patient reminded of \"due or due soon\" health maintenance. I have asked the patient to contact his/her primary care provider (PCP) for follow-up on his/her health maintenance. Coordination of Care Questionnaire:  :   1) Have you been to an emergency room, urgent care, or hospitalized since your last visit? If yes, where when, and reason for visit? no       2. Have seen or consulted any other health care provider since your last visit? If yes, where when, and reason for visit? NO      Patient is accompanied by self I have received verbal consent from Carey Child to discuss any/all medical information while they are present in the room.

## 2022-02-09 LAB
ALBUMIN SERPL-MCNC: 3.8 G/DL (ref 3.5–5)
ALBUMIN/GLOB SERPL: 1.4 {RATIO} (ref 1.1–2.2)
ALP SERPL-CCNC: 116 U/L (ref 45–117)
ALT SERPL-CCNC: 35 U/L (ref 12–78)
ANION GAP SERPL CALC-SCNC: 7 MMOL/L (ref 5–15)
AST SERPL-CCNC: 33 U/L (ref 15–37)
BILIRUB DIRECT SERPL-MCNC: 0.2 MG/DL (ref 0–0.2)
BILIRUB SERPL-MCNC: 0.6 MG/DL (ref 0.2–1)
BUN SERPL-MCNC: 12 MG/DL (ref 6–20)
BUN/CREAT SERPL: 12 (ref 12–20)
CALCIUM SERPL-MCNC: 9.3 MG/DL (ref 8.5–10.1)
CHLORIDE SERPL-SCNC: 108 MMOL/L (ref 97–108)
CO2 SERPL-SCNC: 26 MMOL/L (ref 21–32)
CREAT SERPL-MCNC: 1.01 MG/DL (ref 0.7–1.3)
ERYTHROCYTE [DISTWIDTH] IN BLOOD BY AUTOMATED COUNT: 15.7 % (ref 11.5–14.5)
FERRITIN SERPL-MCNC: 8 NG/ML (ref 26–388)
GLOBULIN SER CALC-MCNC: 2.7 G/DL (ref 2–4)
GLUCOSE SERPL-MCNC: 106 MG/DL (ref 65–100)
HCT VFR BLD AUTO: 29.1 % (ref 36.6–50.3)
HGB BLD-MCNC: 8.8 G/DL (ref 12.1–17)
INR PPP: 1.1 (ref 0.9–1.1)
IRON SATN MFR SERPL: 4 % (ref 20–50)
IRON SERPL-MCNC: 16 UG/DL (ref 35–150)
MCH RBC QN AUTO: 24.8 PG (ref 26–34)
MCHC RBC AUTO-ENTMCNC: 30.2 G/DL (ref 30–36.5)
MCV RBC AUTO: 82 FL (ref 80–99)
NRBC # BLD: 0 K/UL (ref 0–0.01)
NRBC BLD-RTO: 0 PER 100 WBC
PLATELET # BLD AUTO: 295 K/UL (ref 150–400)
PMV BLD AUTO: 10 FL (ref 8.9–12.9)
POTASSIUM SERPL-SCNC: 3.9 MMOL/L (ref 3.5–5.1)
PROT SERPL-MCNC: 6.5 G/DL (ref 6.4–8.2)
PROTHROMBIN TIME: 11.9 SEC (ref 9–11.1)
RBC # BLD AUTO: 3.55 M/UL (ref 4.1–5.7)
SODIUM SERPL-SCNC: 141 MMOL/L (ref 136–145)
TIBC SERPL-MCNC: 445 UG/DL (ref 250–450)
WBC # BLD AUTO: 5 K/UL (ref 4.1–11.1)

## 2022-02-11 LAB
A2 MACROGLOB SERPL-MCNC: 258 MG/DL (ref 110–276)
AFP L3 MFR SERPL: NORMAL % (ref 0–9.9)
AFP SERPL-MCNC: 2.3 NG/ML (ref 0–8)
ALT (SGPT) P5P, 001547: 31 IU/L (ref 0–55)
APO A-I SERPL-MCNC: 127 MG/DL (ref 101–178)
BILIRUB SERPL-MCNC: 0.5 MG/DL (ref 0–1.2)
COMMENT , 550094: ABNORMAL
FIBROSIS SCORE, 550102, HCVF1: 0.28 (ref 0–0.21)
FIBROSIS SCORING:, 550107: ABNORMAL
FIBROSIS STAGE, 550132: ABNORMAL
GGT SERPL-CCNC: 30 IU/L (ref 0–65)
HAPTOGLOB SERPL-MCNC: 107 MG/DL (ref 17–317)
HCV AB S/CO SERPL IA: >11 S/CO RATIO (ref 0–0.9)
HCV RNA SERPL NAA+PROBE-ACNC: NORMAL IU/ML
INTERPRETATION, 550106: ABNORMAL
INTERPRETATION, 550396: NORMAL
LIMITATIONS, 550105: ABNORMAL
NECROINFLAMM ACTIVITY SCORING:, 550121: ABNORMAL
NECROINFLAMMAT ACTIVITY GRADE, 550133: ABNORMAL
NECROINFLAMMAT ACTIVITY SCORE, 550103: 0.15 (ref 0–0.17)
REF LAB TEST REF RANGE: NORMAL

## 2022-02-14 ENCOUNTER — TELEPHONE (OUTPATIENT)
Dept: HEMATOLOGY | Age: 32
End: 2022-02-14

## 2022-02-14 NOTE — PROGRESS NOTES
No hcv. Needs no treatment. Iron/ferritin/hgb low. Follow up with Esther Yu. Start otc iron supplement. F1 - expect improvement with sobriety.

## 2022-02-25 ENCOUNTER — TELEPHONE (OUTPATIENT)
Dept: HEMATOLOGY | Age: 32
End: 2022-02-25

## 2022-03-18 PROBLEM — Z72.0 TOBACCO USE: Status: ACTIVE | Noted: 2020-10-23

## 2022-03-18 PROBLEM — K70.10 ALCOHOLIC HEPATITIS: Status: ACTIVE | Noted: 2020-10-23

## 2022-03-18 PROBLEM — D64.9 ANEMIA: Status: ACTIVE | Noted: 2021-01-01

## 2022-03-19 PROBLEM — F32.A ANXIETY AND DEPRESSION: Status: ACTIVE | Noted: 2020-10-23

## 2022-03-19 PROBLEM — K70.31 ALCOHOLIC CIRRHOSIS OF LIVER WITH ASCITES (HCC): Status: ACTIVE | Noted: 2020-10-23

## 2022-03-19 PROBLEM — F41.9 ANXIETY AND DEPRESSION: Status: ACTIVE | Noted: 2020-10-23

## 2022-03-19 PROBLEM — F41.0 PANIC ATTACK: Status: ACTIVE | Noted: 2020-10-23

## 2022-03-19 PROBLEM — F10.10 ALCOHOL ABUSE: Status: ACTIVE | Noted: 2020-10-23

## 2022-03-19 PROBLEM — B18.2 CHRONIC HEPATITIS C WITHOUT HEPATIC COMA (HCC): Status: ACTIVE | Noted: 2020-11-30

## 2022-07-29 ENCOUNTER — DOCUMENTATION ONLY (OUTPATIENT)
Dept: HEMATOLOGY | Age: 32
End: 2022-07-29

## 2022-08-04 ENCOUNTER — TELEPHONE (OUTPATIENT)
Dept: HEMATOLOGY | Age: 32
End: 2022-08-04